# Patient Record
Sex: MALE | Race: WHITE | Employment: UNEMPLOYED | URBAN - NONMETROPOLITAN AREA
[De-identification: names, ages, dates, MRNs, and addresses within clinical notes are randomized per-mention and may not be internally consistent; named-entity substitution may affect disease eponyms.]

---

## 2017-10-16 ENCOUNTER — APPOINTMENT (OUTPATIENT)
Dept: GENERAL RADIOLOGY | Age: 32
End: 2017-10-16

## 2017-10-16 ENCOUNTER — HOSPITAL ENCOUNTER (EMERGENCY)
Age: 32
Discharge: HOME | End: 2017-10-16

## 2017-10-16 VITALS
TEMPERATURE: 99.2 F | BODY MASS INDEX: 25.77 KG/M2 | HEIGHT: 70 IN | DIASTOLIC BLOOD PRESSURE: 68 MMHG | WEIGHT: 180 LBS | SYSTOLIC BLOOD PRESSURE: 102 MMHG | RESPIRATION RATE: 17 BRPM | OXYGEN SATURATION: 98 % | HEART RATE: 62 BPM

## 2017-10-16 DIAGNOSIS — M54.9 MUSCULOSKELETAL BACK PAIN: Primary | ICD-10-CM

## 2017-10-16 DIAGNOSIS — G44.209 TENSION HEADACHE: ICD-10-CM

## 2017-10-16 LAB
ANION GAP SERPL CALCULATED.3IONS-SCNC: 12 MEQ/L (ref 8–16)
BASOPHILS # BLD: 0.6 %
BASOPHILS ABSOLUTE: 0 THOU/MM3 (ref 0–0.1)
BUN BLDV-MCNC: 14 MG/DL (ref 7–22)
CALCIUM SERPL-MCNC: 8.7 MG/DL (ref 8.5–10.5)
CHLORIDE BLD-SCNC: 101 MEQ/L (ref 98–111)
CO2: 29 MEQ/L (ref 23–33)
CREAT SERPL-MCNC: 0.8 MG/DL (ref 0.4–1.2)
EOSINOPHIL # BLD: 1.6 %
EOSINOPHILS ABSOLUTE: 0.1 THOU/MM3 (ref 0–0.4)
GFR SERPL CREATININE-BSD FRML MDRD: > 90 ML/MIN/1.73M2
GLUCOSE BLD-MCNC: 92 MG/DL (ref 70–108)
HCT VFR BLD CALC: 45.7 % (ref 42–52)
HEMOGLOBIN: 16 GM/DL (ref 14–18)
LYMPHOCYTES # BLD: 29.1 %
LYMPHOCYTES ABSOLUTE: 2.4 THOU/MM3 (ref 1–4.8)
MCH RBC QN AUTO: 31.4 PG (ref 27–31)
MCHC RBC AUTO-ENTMCNC: 34.9 GM/DL (ref 33–37)
MCV RBC AUTO: 90.1 FL (ref 80–94)
MONOCYTES # BLD: 6 %
MONOCYTES ABSOLUTE: 0.5 THOU/MM3 (ref 0.4–1.3)
NUCLEATED RED BLOOD CELLS: 0 /100 WBC
OSMOLALITY CALCULATION: 283.2 MOSMOL/KG (ref 275–300)
PDW BLD-RTO: 13.6 % (ref 11.5–14.5)
PLATELET # BLD: 200 THOU/MM3 (ref 130–400)
PMV BLD AUTO: 8.5 MCM (ref 7.4–10.4)
POTASSIUM SERPL-SCNC: 4.3 MEQ/L (ref 3.5–5.2)
RBC # BLD: 5.08 MILL/MM3 (ref 4.7–6.1)
RBC # BLD: NORMAL 10*6/UL
SEG NEUTROPHILS: 62.7 %
SEGMENTED NEUTROPHILS ABSOLUTE COUNT: 5.1 THOU/MM3 (ref 1.8–7.7)
SODIUM BLD-SCNC: 142 MEQ/L (ref 135–145)
TROPONIN T: < 0.01 NG/ML
WBC # BLD: 8.2 THOU/MM3 (ref 4.8–10.8)

## 2017-10-16 PROCEDURE — 84484 ASSAY OF TROPONIN QUANT: CPT

## 2017-10-16 PROCEDURE — 99284 EMERGENCY DEPT VISIT MOD MDM: CPT

## 2017-10-16 PROCEDURE — 36415 COLL VENOUS BLD VENIPUNCTURE: CPT

## 2017-10-16 PROCEDURE — 6360000002 HC RX W HCPCS: Performed by: STUDENT IN AN ORGANIZED HEALTH CARE EDUCATION/TRAINING PROGRAM

## 2017-10-16 PROCEDURE — 96361 HYDRATE IV INFUSION ADD-ON: CPT

## 2017-10-16 PROCEDURE — 80048 BASIC METABOLIC PNL TOTAL CA: CPT

## 2017-10-16 PROCEDURE — 71020 XR CHEST STANDARD TWO VW: CPT

## 2017-10-16 PROCEDURE — 96374 THER/PROPH/DIAG INJ IV PUSH: CPT

## 2017-10-16 PROCEDURE — 2580000003 HC RX 258: Performed by: STUDENT IN AN ORGANIZED HEALTH CARE EDUCATION/TRAINING PROGRAM

## 2017-10-16 PROCEDURE — 6370000000 HC RX 637 (ALT 250 FOR IP): Performed by: STUDENT IN AN ORGANIZED HEALTH CARE EDUCATION/TRAINING PROGRAM

## 2017-10-16 PROCEDURE — 85025 COMPLETE CBC W/AUTO DIFF WBC: CPT

## 2017-10-16 RX ORDER — NAPROXEN 500 MG/1
500 TABLET ORAL 2 TIMES DAILY WITH MEALS
Qty: 30 TABLET | Refills: 0 | Status: SHIPPED | OUTPATIENT
Start: 2017-10-16 | End: 2017-11-07

## 2017-10-16 RX ORDER — CYCLOBENZAPRINE HCL 5 MG
5 TABLET ORAL 3 TIMES DAILY PRN
Qty: 30 TABLET | Refills: 0 | Status: SHIPPED | OUTPATIENT
Start: 2017-10-16 | End: 2017-10-26

## 2017-10-16 RX ORDER — KETOROLAC TROMETHAMINE 30 MG/ML
30 INJECTION, SOLUTION INTRAMUSCULAR; INTRAVENOUS ONCE
Status: COMPLETED | OUTPATIENT
Start: 2017-10-16 | End: 2017-10-16

## 2017-10-16 RX ORDER — CYCLOBENZAPRINE HCL 10 MG
5 TABLET ORAL ONCE
Status: COMPLETED | OUTPATIENT
Start: 2017-10-16 | End: 2017-10-16

## 2017-10-16 RX ORDER — 0.9 % SODIUM CHLORIDE 0.9 %
1000 INTRAVENOUS SOLUTION INTRAVENOUS ONCE
Status: COMPLETED | OUTPATIENT
Start: 2017-10-16 | End: 2017-10-16

## 2017-10-16 RX ADMIN — SODIUM CHLORIDE 1000 ML: 9 INJECTION, SOLUTION INTRAVENOUS at 17:05

## 2017-10-16 RX ADMIN — CYCLOBENZAPRINE HYDROCHLORIDE 5 MG: 10 TABLET, FILM COATED ORAL at 17:47

## 2017-10-16 RX ADMIN — KETOROLAC TROMETHAMINE 30 MG: 30 INJECTION, SOLUTION INTRAMUSCULAR at 17:05

## 2017-10-16 ASSESSMENT — ENCOUNTER SYMPTOMS
NAUSEA: 0
VOMITING: 0
SORE THROAT: 0
RHINORRHEA: 0
COLOR CHANGE: 0
DIARRHEA: 0
SHORTNESS OF BREATH: 0
ABDOMINAL PAIN: 0
BACK PAIN: 1
CHEST TIGHTNESS: 0
CONSTIPATION: 0

## 2017-10-16 ASSESSMENT — PAIN DESCRIPTION - PAIN TYPE
TYPE: ACUTE PAIN
TYPE: ACUTE PAIN

## 2017-10-16 ASSESSMENT — PAIN SCALES - GENERAL
PAINLEVEL_OUTOF10: 6
PAINLEVEL_OUTOF10: 6
PAINLEVEL_OUTOF10: 7

## 2017-10-16 ASSESSMENT — PAIN DESCRIPTION - FREQUENCY
FREQUENCY: CONTINUOUS
FREQUENCY: CONTINUOUS

## 2017-10-16 ASSESSMENT — PAIN DESCRIPTION - LOCATION
LOCATION: BACK;SHOULDER
LOCATION: NECK

## 2017-10-16 ASSESSMENT — PAIN DESCRIPTION - ORIENTATION: ORIENTATION: RIGHT

## 2017-10-16 NOTE — LETTER
[facility]'s Emergency Department   [place], [address]          PROOF OF PRESENCE      To Whom It May Concern:    Sylvester Santos was present in the Emergency Department at [facility] Emergency Department on 10/16/2017.                                      Sincerely,      Cari Hilton RN for Souleymane Martinez      ____________________________________________________

## 2017-10-16 NOTE — ED PROVIDER NOTES
[facility] EMERGENCY DEPT      CHIEF COMPLAINT       Chief Complaint   Patient presents with    Headache    Back Pain       Nurses Notes reviewed and I agree except as noted in the HPI. HISTORY OF PRESENT ILLNESS    Tresa De La Cruz is a 36 y.o. male who presents with back pain and a headache. He states the back pain started a few days ago. He states he was originally unable to identify where the back pain was located. Yesterday, the back pain began to localize to the right side and is concentrated just below his right scapula. He states his headache began this morning about an hour or two after starting work. He describes the back pain as achy, but is intermittently sharp, especially when he takes deep breaths. He describes his headache as a dull, achy pain that is generalized to all parts of his head. He denies getting headaches but he is concerned he may be getting an ear infection because he usually gets a headache when he gets ear infections. He also states he has frequent problems with his ears. He denies otalgia currently, but states they feel full. Pt states he does a lot of repetitive movements at work and thinks he may have strained a muscle in his back there. He does admit that his back felt much better over the weekend when he wasn't working, but started hurting more as soon as he started work this morning. Pt does not take any medications regularly and denies and medical problems. Pt denies fever, chills, nausea, vomiting, abdominal pain, chest pain, SOB, diarrhea, constipation, vision changes, numbness, tingling, and weakness. REVIEW OF SYSTEMS     Review of Systems   Constitutional: Negative for appetite change, chills, diaphoresis and fever. HENT: Positive for congestion. Negative for ear discharge, ear pain, rhinorrhea, sore throat and tinnitus. Eyes: Negative for visual disturbance. Respiratory: Negative for chest tightness and shortness of breath.     Cardiovascular: Negative for chest pain and palpitations. Gastrointestinal: Negative for abdominal pain, constipation, diarrhea, nausea and vomiting. Genitourinary: Negative for dysuria, frequency, hematuria and urgency. Musculoskeletal: Positive for back pain and myalgias. Negative for arthralgias, neck pain and neck stiffness. Skin: Negative for color change and pallor. Neurological: Positive for headaches. Negative for dizziness, weakness, light-headedness and numbness. Hematological: Negative for adenopathy. Psychiatric/Behavioral: Negative for agitation, confusion, decreased concentration and sleep disturbance. PAST MEDICAL HISTORY    has no past medical history on file. SURGICAL HISTORY      has a past surgical history that includes Appendectomy and Dental surgery. CURRENT MEDICATIONS       Discharge Medication List as of 10/16/2017  7:04 PM      CONTINUE these medications which have NOT CHANGED    Details   permethrin (ELIMITE) 5 % cream Apply topically as directed. Leave on for 8-12 hours then rinse off thoroughly. Repeat in 1 week., Disp-1 Tube, R-1, Print             ALLERGIES     has No Known Allergies. FAMILY HISTORY     indicated that the status of his father is unknown.    family history includes Asthma in his father; High Blood Pressure in his father. SOCIAL HISTORY      reports that he has been smoking Cigarettes. He has a 5.00 pack-year smoking history. He does not have any smokeless tobacco history on file. He reports that he does not drink alcohol or use drugs. PHYSICAL EXAM     INITIAL VITALS:  height is 5' 10\" (1.778 m) and weight is 180 lb (81.6 kg). His oral temperature is 99.2 °F (37.3 °C). His blood pressure is 102/68 and his pulse is 62. His respiration is 17 and oxygen saturation is 98%. Physical Exam   Constitutional: He is oriented to person, place, and time. He appears well-developed and well-nourished. No distress. HENT:   Head: Normocephalic and atraumatic.    Eyes: Conjunctivae are normal.   Neck: Normal range of motion and full passive range of motion without pain. Neck supple. Muscular tenderness present. No spinous process tenderness present. No Brudzinski's sign and no Kernig's sign noted. Cardiovascular: Normal rate, regular rhythm and normal heart sounds. Exam reveals no gallop and no friction rub. No murmur heard. Pulmonary/Chest: Effort normal and breath sounds normal. No respiratory distress. He has no wheezes. Abdominal: Soft. He exhibits no distension and no mass. There is no tenderness. There is no guarding. Musculoskeletal: Normal range of motion. He exhibits tenderness. He exhibits no edema or deformity. Thoracic back: He exhibits pain. He exhibits normal range of motion, no tenderness, no bony tenderness, no swelling, no deformity and no spasm. Reported tenderness to palpation to the right side of the back just inferior to scapula, right shoulder, and right side of the neck. Lymphadenopathy:     He has no cervical adenopathy. Neurological: He is alert and oriented to person, place, and time. Coordination normal.   Skin: Skin is warm and dry. He is not diaphoretic. No pallor. Psychiatric: His behavior is normal. Thought content normal.   Nursing note and vitals reviewed. DIFFERENTIAL DIAGNOSIS:   Lumbar back strain, sprain, herniated disc, less likely fracture or acs    DIAGNOSTIC RESULTS     EKG: All EKG's are interpreted by the Emergency Department Physician who either signs or Co-signs this chart in the absence of a cardiologist.  none    RADIOLOGY: non-plain film images(s) such as CT, Ultrasound and MRI are read by the radiologist.  Plain radiographic images are visualized and preliminarily interpreted by the emergency physician unless otherwise stated below. XR CHEST STANDARD (2 VW)   Final Result   1. No acute cardiopulmonary disease. **This report has been created using voice recognition software.   It may contain minor errors which are inherent in voice recognition technology. **      Final report electronically signed by Dr. Jamee Tate on 10/16/2017 5:20 PM          LABS:   Results for orders placed or performed during the hospital encounter of 10/16/17   Troponin   Result Value Ref Range    Troponin T < 0.010 ng/ml   CBC auto differential   Result Value Ref Range    WBC 8.2 4.8 - 10.8 thou/mm3    RBC 5.08 4.70 - 6.10 mill/mm3    Hemoglobin 16.0 14.0 - 18.0 gm/dl    Hematocrit 45.7 42.0 - 52.0 %    MCV 90.1 80.0 - 94.0 fL    MCH 31.4 (H) 27.0 - 31.0 pg    MCHC 34.9 33.0 - 37.0 gm/dl    RDW 13.6 11.5 - 14.5 %    Platelets 844 275 - 794 thou/mm3    MPV 8.5 7.4 - 10.4 mcm    RBC Morphology NORMAL     Seg Neutrophils 62.7 %    Lymphocytes 29.1 %    Monocytes 6.0 %    Eosinophils 1.6 %    Basophils 0.6 %    nRBC 0 /100 wbc    Segs Absolute 5.1 1.8 - 7.7 thou/mm3    Lymphocytes # 2.4 1.0 - 4.8 thou/mm3    Monocytes # 0.5 0.4 - 1.3 thou/mm3    Eosinophils # 0.1 0.0 - 0.4 thou/mm3    Basophils # 0.0 0.0 - 0.1 thou/mm3   Basic Metabolic Panel   Result Value Ref Range    Sodium 142 135 - 145 meq/L    Potassium 4.3 3.5 - 5.2 meq/L    Chloride 101 98 - 111 meq/L    CO2 29 23 - 33 meq/L    Glucose 92 70 - 108 mg/dL    BUN 14 7 - 22 mg/dL    CREATININE 0.8 0.4 - 1.2 mg/dL    Calcium 8.7 8.5 - 10.5 mg/dL   Anion Gap   Result Value Ref Range    Anion Gap 12.0 8.0 - 16.0 meq/L   Glomerular Filtration Rate, Estimated   Result Value Ref Range    Est, Glom Filt Rate >90 ml/min/1.73m2   Osmolality   Result Value Ref Range    Osmolality Calc 283.2 275.0 - 300 mOsmol/kg       EMERGENCY DEPARTMENT COURSE:   Vitals:    Vitals:    10/16/17 1617 10/16/17 1731 10/16/17 1924   BP: 110/65 121/72 102/68   Pulse: 65 67 62   Resp: 16 16 17   Temp: 99.2 °F (37.3 °C)     TempSrc: Oral     SpO2: 98% 99% 98%   Weight:  180 lb (81.6 kg)    Height:  5' 10\" (1.778 m)        [facility]  Patient seen and evaluated. Labs and imaging ordered. Vitals are stable in the ED. Imaging and lab work was reassuring and patient expressed relief of symptoms with NS, toradol, and flexeril. He is comfortable with plan of discharge home and will return if symptoms worsen or change. Medications Given in the ED  Medications   0.9 % sodium chloride bolus (0 mLs Intravenous Stopped 10/16/17 1923)   ketorolac (TORADOL) injection 30 mg (30 mg Intravenous Given 10/16/17 1705)   cyclobenzaprine (FLEXERIL) tablet 5 mg (5 mg Oral Given 10/16/17 5197)         CRITICAL CARE[de-identified]   NONE    CONSULTS:  None    PROCEDURES:  None    FINAL IMPRESSION      1. Musculoskeletal back pain    2.  Tension headache          DISPOSITION/PLAN   Decision To Discharge    PATIENT REFERRED TO:  [facility] EMERGENCY DEPT  [address]  [phone]    If symptoms worsen      DISCHARGE MEDICATIONS:  Discharge Medication List as of 10/16/2017  7:04 PM      START taking these medications    Details   cyclobenzaprine (FLEXERIL) 5 MG tablet Take 1 tablet by mouth 3 times daily as needed for Muscle spasms, Disp-30 tablet, R-0Print      naproxen (NAPROSYN) 500 MG tablet Take 1 tablet by mouth 2 times daily (with meals) for 30 doses, Disp-30 tablet, R-0Print             (Please note that portions of this note were completed with a voice recognition program.  Efforts were made to edit the dictations but occasionally words are mis-transcribed.)      Ebony Machado PA-C 10/16/17 4:48 PM            Ebony Machado PA-C  10/25/17 7825

## 2017-10-16 NOTE — ED NOTES
Patient in ed room 37. Patient complain of headache and pain behind right shoulder blade. Patient in gown on monitor, vital signs obtained and assessment completed.      Chon Menjivar RN  10/16/17 3526

## 2017-11-07 ENCOUNTER — APPOINTMENT (OUTPATIENT)
Dept: GENERAL RADIOLOGY | Age: 32
End: 2017-11-07

## 2017-11-07 ENCOUNTER — HOSPITAL ENCOUNTER (EMERGENCY)
Age: 32
Discharge: HOME | End: 2017-11-07

## 2017-11-07 VITALS
DIASTOLIC BLOOD PRESSURE: 70 MMHG | RESPIRATION RATE: 18 BRPM | HEART RATE: 75 BPM | OXYGEN SATURATION: 96 % | SYSTOLIC BLOOD PRESSURE: 126 MMHG | TEMPERATURE: 98.3 F

## 2017-11-07 DIAGNOSIS — J40 BRONCHITIS: Primary | ICD-10-CM

## 2017-11-07 DIAGNOSIS — M94.0 COSTOCHONDRITIS: ICD-10-CM

## 2017-11-07 LAB
ANION GAP SERPL CALCULATED.3IONS-SCNC: 11 MEQ/L (ref 8–16)
BASOPHILS # BLD: 0.7 %
BASOPHILS ABSOLUTE: 0.1 THOU/MM3 (ref 0–0.1)
BUN BLDV-MCNC: 11 MG/DL (ref 7–22)
CALCIUM SERPL-MCNC: 8.5 MG/DL (ref 8.5–10.5)
CHLORIDE BLD-SCNC: 105 MEQ/L (ref 98–111)
CO2: 26 MEQ/L (ref 23–33)
CREAT SERPL-MCNC: 0.6 MG/DL (ref 0.4–1.2)
D-DIMER QUANTITATIVE: < 215 NG/ML FEU (ref 0–500)
EOSINOPHIL # BLD: 2.3 %
EOSINOPHILS ABSOLUTE: 0.2 THOU/MM3 (ref 0–0.4)
GFR SERPL CREATININE-BSD FRML MDRD: > 90 ML/MIN/1.73M2
GLUCOSE BLD-MCNC: 83 MG/DL (ref 70–108)
HCT VFR BLD CALC: 50.3 % (ref 42–52)
HEMOGLOBIN: 17.8 GM/DL (ref 14–18)
LYMPHOCYTES # BLD: 27.2 %
LYMPHOCYTES ABSOLUTE: 2.7 THOU/MM3 (ref 1–4.8)
MCH RBC QN AUTO: 32 PG (ref 27–31)
MCHC RBC AUTO-ENTMCNC: 35.4 GM/DL (ref 33–37)
MCV RBC AUTO: 90.3 FL (ref 80–94)
MONOCYTES # BLD: 5.6 %
MONOCYTES ABSOLUTE: 0.6 THOU/MM3 (ref 0.4–1.3)
NUCLEATED RED BLOOD CELLS: 0 /100 WBC
OSMOLALITY CALCULATION: 281.7 MOSMOL/KG (ref 275–300)
PDW BLD-RTO: 13.7 % (ref 11.5–14.5)
PLATELET # BLD: 201 THOU/MM3 (ref 130–400)
PMV BLD AUTO: 8.3 MCM (ref 7.4–10.4)
POTASSIUM SERPL-SCNC: 4 MEQ/L (ref 3.5–5.2)
RBC # BLD: 5.58 MILL/MM3 (ref 4.7–6.1)
SEG NEUTROPHILS: 64.2 %
SEGMENTED NEUTROPHILS ABSOLUTE COUNT: 6.4 THOU/MM3 (ref 1.8–7.7)
SODIUM BLD-SCNC: 142 MEQ/L (ref 135–145)
TROPONIN T: < 0.01 NG/ML
WBC # BLD: 9.9 THOU/MM3 (ref 4.8–10.8)

## 2017-11-07 PROCEDURE — 6360000002 HC RX W HCPCS: Performed by: PHYSICIAN ASSISTANT

## 2017-11-07 PROCEDURE — 96372 THER/PROPH/DIAG INJ SC/IM: CPT

## 2017-11-07 PROCEDURE — 85025 COMPLETE CBC W/AUTO DIFF WBC: CPT

## 2017-11-07 PROCEDURE — 93005 ELECTROCARDIOGRAM TRACING: CPT

## 2017-11-07 PROCEDURE — 99285 EMERGENCY DEPT VISIT HI MDM: CPT

## 2017-11-07 PROCEDURE — 80048 BASIC METABOLIC PNL TOTAL CA: CPT

## 2017-11-07 PROCEDURE — 71020 XR CHEST STANDARD TWO VW: CPT

## 2017-11-07 PROCEDURE — 36415 COLL VENOUS BLD VENIPUNCTURE: CPT

## 2017-11-07 PROCEDURE — 6370000000 HC RX 637 (ALT 250 FOR IP): Performed by: PHYSICIAN ASSISTANT

## 2017-11-07 PROCEDURE — 85379 FIBRIN DEGRADATION QUANT: CPT

## 2017-11-07 PROCEDURE — 84484 ASSAY OF TROPONIN QUANT: CPT

## 2017-11-07 RX ORDER — TRAMADOL HYDROCHLORIDE 50 MG/1
50 TABLET ORAL EVERY 6 HOURS PRN
Qty: 12 TABLET | Refills: 0 | Status: SHIPPED | OUTPATIENT
Start: 2017-11-07 | End: 2019-05-17 | Stop reason: ALTCHOICE

## 2017-11-07 RX ORDER — LIDOCAINE 50 MG/G
1 PATCH TOPICAL DAILY
Qty: 15 PATCH | Refills: 0 | Status: SHIPPED | OUTPATIENT
Start: 2017-11-07 | End: 2017-11-07 | Stop reason: ALTCHOICE

## 2017-11-07 RX ORDER — KETOROLAC TROMETHAMINE 30 MG/ML
60 INJECTION, SOLUTION INTRAMUSCULAR; INTRAVENOUS ONCE
Status: COMPLETED | OUTPATIENT
Start: 2017-11-07 | End: 2017-11-07

## 2017-11-07 RX ORDER — LIDOCAINE 50 MG/G
1 PATCH TOPICAL ONCE
Status: DISCONTINUED | OUTPATIENT
Start: 2017-11-07 | End: 2017-11-07 | Stop reason: HOSPADM

## 2017-11-07 RX ORDER — NAPROXEN 500 MG/1
500 TABLET ORAL 2 TIMES DAILY
Qty: 60 TABLET | Refills: 0 | Status: SHIPPED | OUTPATIENT
Start: 2017-11-07 | End: 2019-05-17 | Stop reason: ALTCHOICE

## 2017-11-07 RX ORDER — LIDOCAINE 50 MG/G
1 PATCH TOPICAL DAILY
Qty: 15 PATCH | Refills: 0 | Status: SHIPPED | OUTPATIENT
Start: 2017-11-07 | End: 2019-05-17 | Stop reason: ALTCHOICE

## 2017-11-07 RX ADMIN — KETOROLAC TROMETHAMINE 60 MG: 30 INJECTION, SOLUTION INTRAMUSCULAR at 20:27

## 2017-11-07 ASSESSMENT — ENCOUNTER SYMPTOMS
ABDOMINAL PAIN: 0
STRIDOR: 0
COUGH: 1
COLOR CHANGE: 0
SHORTNESS OF BREATH: 1
VOMITING: 0
NAUSEA: 0
BACK PAIN: 0
CHEST TIGHTNESS: 1
DIARRHEA: 0
WHEEZING: 0

## 2017-11-07 ASSESSMENT — PAIN SCALES - GENERAL: PAINLEVEL_OUTOF10: 9

## 2017-11-08 LAB
EKG ATRIAL RATE: 74 BPM
EKG P AXIS: 76 DEGREES
EKG P-R INTERVAL: 160 MS
EKG Q-T INTERVAL: 338 MS
EKG QRS DURATION: 88 MS
EKG QTC CALCULATION (BAZETT): 375 MS
EKG R AXIS: 40 DEGREES
EKG T AXIS: 53 DEGREES
EKG VENTRICULAR RATE: 74 BPM

## 2017-11-08 NOTE — ED PROVIDER NOTES
[facility]  eMERGENCY dEPARTMENT eNCOUnter          [address] [facility]       Chief Complaint   Patient presents with    Chest Pain     x 5 days       Nurses Notes reviewed and I agree except as noted in the HPI. HISTORY OF PRESENT ILLNESS    Pako Schwartz is a 36 y.o. male who presents to the Emergency Department for the evaluation of chest pain. The patient reports that his chest pain began five days ago without known injury and has gradually worsened with time. The pain is located in the left side of his chest with radiation into the left side of his neck. His pain is described as intermittent stabbing that lasts anywhere from 30 seconds to 10 minutes at a time. Patient currently rates his pain as an 8/10 in severity and states worsens with palpation, movement, and with cough. If he rests completely still, he has no pain. He does also note that he performs heavy lifting regularly at work. He also complains of associated shortness of breath, chest tightness, and a cough that has been improving. The patient denies any fever, chills, diaphoresis, palpitations, nausea, or vomiting. Patient denies any history of hypertension, hypercholesterolemia, diabetes, or a family history of heart disease at a young age. Denies any IV or other drug use. He is a smoker. No further complaints at initial time of encounter. The HPI was provided by the patient. REVIEW OF SYSTEMS     Review of Systems   Constitutional: Negative for chills, diaphoresis, fatigue and fever. Respiratory: Positive for cough, chest tightness and shortness of breath. Negative for wheezing and stridor. Cardiovascular: Positive for chest pain. Negative for palpitations and leg swelling. Gastrointestinal: Negative for abdominal pain, diarrhea, nausea and vomiting. Musculoskeletal: Negative for back pain and neck pain. Skin: Negative for color change and pallor. Allergic/Immunologic: Negative for immunocompromised state. patient's condition to remain stable during the duration of their stay. I explained my proposed course of treatment to the patient, and they were amenable to my decision. They were discharged home with prescriptions for Tramadol and a Lidoderm patch, and they will return to the ED if their symptoms become more severe in nature, or otherwise change. CRITICAL CARE:   None. CONSULTS:   None    PROCEDURES:  None    FINAL IMPRESSION      1. Bronchitis    2. Costochondritis          DISPOSITION/PLAN     Discharge    PATIENT REFERRED TO:  [address] [facility]  [address]  Call in 1 day      [address] [place] DEPT  [address] [facility]  [phone]    If symptoms worsen      DISCHARGE MEDICATIONS:  Discharge Medication List as of 11/7/2017  8:17 PM      START taking these medications    Details   lidocaine (LIDODERM) 5 % Place 1 patch onto the skin daily 12 hours on, 12 hours off., Disp-15 patch, R-0Print      traMADol (ULTRAM) 50 MG tablet Take 1 tablet by mouth every 6 hours as needed for Pain ., Disp-12 tablet, R-0Print             (Please note that portions of this note were completed with a voice recognition program.  Efforts were made to edit the dictations but occasionally words are mis-transcribed.)    This patient was seen independently by Arnaud Macdonald PA-C a Mid-Level Provider in the [facility] Emergency Department. The patient was given an opportunity to see the Emergency Attending. The patient voiced understanding that I was a Mid-Level Provider and was in agreement with being seen independently by myself. Scribe:  Laureen Mendez 11/07/17 8:09 PM Scribing for and in the presence of Arnaud Macdonald PA-C.     Signed by: Laurie Lara, 11/7/17 10:49 PM    Provider:  I personally performed the services described in the documentation, reviewed and edited the documentation which was dictated to the scribe in my presence, and it

## 2018-05-14 ENCOUNTER — HOSPITAL ENCOUNTER (OUTPATIENT)
Age: 33
Discharge: HOME | End: 2018-05-14

## 2018-05-14 ENCOUNTER — HOSPITAL ENCOUNTER (OUTPATIENT)
Dept: GENERAL RADIOLOGY | Age: 33
Discharge: HOME | End: 2018-05-14

## 2018-05-14 DIAGNOSIS — S33.5XXA LUMBAR SPRAIN, INITIAL ENCOUNTER: ICD-10-CM

## 2018-06-08 ENCOUNTER — HOSPITAL ENCOUNTER (OUTPATIENT)
Dept: PHYSICAL THERAPY | Age: 33
Setting detail: THERAPIES SERIES
Discharge: HOME | End: 2018-06-08
Payer: COMMERCIAL

## 2018-06-08 PROCEDURE — 97161 PT EVAL LOW COMPLEX 20 MIN: CPT

## 2018-06-08 PROCEDURE — 97110 THERAPEUTIC EXERCISES: CPT

## 2018-06-08 ASSESSMENT — PAIN DESCRIPTION - PAIN TYPE: TYPE: ACUTE PAIN

## 2018-06-08 ASSESSMENT — PAIN DESCRIPTION - LOCATION: LOCATION: BACK

## 2018-06-08 ASSESSMENT — PAIN DESCRIPTION - DESCRIPTORS: DESCRIPTORS: TIGHTNESS

## 2018-06-08 ASSESSMENT — PAIN SCALES - GENERAL: PAINLEVEL_OUTOF10: 5

## 2018-06-08 ASSESSMENT — PAIN DESCRIPTION - ORIENTATION: ORIENTATION: LOWER

## 2018-06-12 ENCOUNTER — HOSPITAL ENCOUNTER (OUTPATIENT)
Dept: PHYSICAL THERAPY | Age: 33
Setting detail: THERAPIES SERIES
Discharge: HOME | End: 2018-06-12
Payer: COMMERCIAL

## 2018-06-12 PROCEDURE — 97110 THERAPEUTIC EXERCISES: CPT

## 2018-06-12 PROCEDURE — 97035 APP MDLTY 1+ULTRASOUND EA 15: CPT

## 2018-06-12 ASSESSMENT — PAIN DESCRIPTION - LOCATION: LOCATION: BACK

## 2018-06-12 ASSESSMENT — PAIN DESCRIPTION - PAIN TYPE: TYPE: ACUTE PAIN

## 2018-06-12 ASSESSMENT — PAIN SCALES - GENERAL: PAINLEVEL_OUTOF10: 3

## 2018-06-12 ASSESSMENT — PAIN DESCRIPTION - ORIENTATION: ORIENTATION: LOWER;LEFT;RIGHT

## 2018-06-14 ENCOUNTER — HOSPITAL ENCOUNTER (OUTPATIENT)
Dept: PHYSICAL THERAPY | Age: 33
Setting detail: THERAPIES SERIES
Discharge: HOME | End: 2018-06-14
Payer: COMMERCIAL

## 2018-06-14 PROCEDURE — 97110 THERAPEUTIC EXERCISES: CPT

## 2018-06-14 PROCEDURE — 97035 APP MDLTY 1+ULTRASOUND EA 15: CPT

## 2018-06-14 ASSESSMENT — PAIN DESCRIPTION - LOCATION: LOCATION: BACK

## 2018-06-14 ASSESSMENT — PAIN SCALES - GENERAL: PAINLEVEL_OUTOF10: 3

## 2018-06-14 ASSESSMENT — PAIN DESCRIPTION - ORIENTATION: ORIENTATION: RIGHT;LEFT;LOWER

## 2018-06-14 ASSESSMENT — PAIN DESCRIPTION - PAIN TYPE: TYPE: ACUTE PAIN

## 2018-06-15 ENCOUNTER — HOSPITAL ENCOUNTER (OUTPATIENT)
Dept: PHYSICAL THERAPY | Age: 33
Setting detail: THERAPIES SERIES
Discharge: HOME | End: 2018-06-15
Payer: COMMERCIAL

## 2018-06-18 ENCOUNTER — HOSPITAL ENCOUNTER (OUTPATIENT)
Dept: PHYSICAL THERAPY | Age: 33
Setting detail: THERAPIES SERIES
Discharge: HOME | End: 2018-06-18
Payer: COMMERCIAL

## 2018-06-18 PROCEDURE — 97110 THERAPEUTIC EXERCISES: CPT

## 2018-06-20 ENCOUNTER — HOSPITAL ENCOUNTER (OUTPATIENT)
Dept: PHYSICAL THERAPY | Age: 33
Setting detail: THERAPIES SERIES
Discharge: HOME | End: 2018-06-20
Payer: COMMERCIAL

## 2018-06-20 PROCEDURE — 97110 THERAPEUTIC EXERCISES: CPT

## 2018-06-22 ENCOUNTER — HOSPITAL ENCOUNTER (OUTPATIENT)
Dept: PHYSICAL THERAPY | Age: 33
Setting detail: THERAPIES SERIES
Discharge: HOME | End: 2018-06-22
Payer: COMMERCIAL

## 2018-06-25 ENCOUNTER — HOSPITAL ENCOUNTER (OUTPATIENT)
Dept: PHYSICAL THERAPY | Age: 33
Setting detail: THERAPIES SERIES
Discharge: HOME | End: 2018-06-25
Payer: COMMERCIAL

## 2018-06-25 PROCEDURE — 97110 THERAPEUTIC EXERCISES: CPT

## 2018-06-27 ENCOUNTER — HOSPITAL ENCOUNTER (OUTPATIENT)
Dept: PHYSICAL THERAPY | Age: 33
Setting detail: THERAPIES SERIES
Discharge: HOME | End: 2018-06-27
Payer: COMMERCIAL

## 2018-06-27 PROCEDURE — 97110 THERAPEUTIC EXERCISES: CPT

## 2018-06-29 ENCOUNTER — HOSPITAL ENCOUNTER (OUTPATIENT)
Dept: PHYSICAL THERAPY | Age: 33
Setting detail: THERAPIES SERIES
Discharge: HOME | End: 2018-06-29
Payer: COMMERCIAL

## 2018-06-29 PROCEDURE — 97110 THERAPEUTIC EXERCISES: CPT

## 2018-07-06 ENCOUNTER — HOSPITAL ENCOUNTER (OUTPATIENT)
Dept: PHYSICAL THERAPY | Age: 33
Setting detail: THERAPIES SERIES
Discharge: HOME | End: 2018-07-06
Payer: COMMERCIAL

## 2019-05-17 ENCOUNTER — HOSPITAL ENCOUNTER (EMERGENCY)
Age: 34
Discharge: HOME | End: 2019-05-17
Payer: COMMERCIAL

## 2019-05-17 VITALS
WEIGHT: 220 LBS | RESPIRATION RATE: 12 BRPM | OXYGEN SATURATION: 98 % | TEMPERATURE: 98 F | DIASTOLIC BLOOD PRESSURE: 82 MMHG | HEART RATE: 59 BPM | BODY MASS INDEX: 31.57 KG/M2 | SYSTOLIC BLOOD PRESSURE: 124 MMHG

## 2019-05-17 DIAGNOSIS — J02.9 ACUTE PHARYNGITIS, UNSPECIFIED ETIOLOGY: ICD-10-CM

## 2019-05-17 DIAGNOSIS — J06.9 ACUTE UPPER RESPIRATORY INFECTION: ICD-10-CM

## 2019-05-17 DIAGNOSIS — J01.90 ACUTE SINUSITIS, RECURRENCE NOT SPECIFIED, UNSPECIFIED LOCATION: ICD-10-CM

## 2019-05-17 DIAGNOSIS — R05.9 COUGH: Primary | ICD-10-CM

## 2019-05-17 PROCEDURE — 99212 OFFICE O/P EST SF 10 MIN: CPT

## 2019-05-17 PROCEDURE — 99214 OFFICE O/P EST MOD 30 MIN: CPT | Performed by: NURSE PRACTITIONER

## 2019-05-17 RX ORDER — AMOXICILLIN AND CLAVULANATE POTASSIUM 875; 125 MG/1; MG/1
1 TABLET, FILM COATED ORAL 2 TIMES DAILY
Qty: 20 TABLET | Refills: 0 | Status: SHIPPED | OUTPATIENT
Start: 2019-05-17 | End: 2019-05-27

## 2019-05-17 RX ORDER — IBUPROFEN 800 MG/1
800 TABLET ORAL EVERY 6 HOURS PRN
COMMUNITY
End: 2020-01-07

## 2019-05-17 ASSESSMENT — ENCOUNTER SYMPTOMS
SHORTNESS OF BREATH: 0
EYE DISCHARGE: 0
WHEEZING: 0
STRIDOR: 0
DIARRHEA: 0
COUGH: 1
NAUSEA: 0
SORE THROAT: 1
VOMITING: 0

## 2019-05-17 NOTE — ED PROVIDER NOTES
tobacco use includes chew. He reports that he does not drink alcohol or use drugs. PHYSICAL EXAM     ED TRIAGE VITALS  BP: 124/82, Temp: 98 °F (36.7 °C), Pulse: 59, Resp: 12, SpO2: 98 %,Estimated body mass index is 31.57 kg/m² as calculated from the following:    Height as of 10/16/17: 5' 10\" (1.778 m). Weight as of this encounter: 220 lb (99.8 kg). ,No LMP for male patient. Physical Exam   Constitutional: He is oriented to person, place, and time. He appears well-developed and well-nourished. No distress. HENT:   Head: Normocephalic and atraumatic. Mouth/Throat: Oropharynx is clear and moist and mucous membranes are normal. No oropharyngeal exudate. Tonsils are 2+ on the right. Tonsils are 2+ on the left. No tonsillar exudate. Eyes: Conjunctivae are normal. Right eye exhibits no discharge. Left eye exhibits no discharge. Right conjunctiva is not injected. Left conjunctiva is not injected. No scleral icterus. Right eye exhibits normal extraocular motion. Left eye exhibits normal extraocular motion. Pupils are equal.   Neck: Normal range of motion. Cardiovascular: Normal rate and regular rhythm. Exam reveals no gallop and no friction rub. No murmur heard. Pulmonary/Chest: Effort normal and breath sounds normal. No accessory muscle usage. No respiratory distress. He has no wheezes. Musculoskeletal: Normal range of motion. Right knee: He exhibits normal range of motion. Left knee: He exhibits normal range of motion. Lymphadenopathy:        Head (right side): No submental, no submandibular and no tonsillar adenopathy present. Head (left side): No submental, no submandibular and no tonsillar adenopathy present. Neurological: He is alert and oriented to person, place, and time. Skin: Skin is warm and dry. He is not diaphoretic. Psychiatric: He has a normal mood and affect.  His behavior is normal. Judgment and thought content normal.       DIAGNOSTIC RESULTS     Labs:No results found for this visit on 05/17/19. IMAGING:    No orders to display     URGENT CARE COURSE:     Vitals:    05/17/19 1021   BP: 124/82   Pulse: 59   Resp: 12   Temp: 98 °F (36.7 °C)   TempSrc: Temporal   SpO2: 98%   Weight: 220 lb (99.8 kg)       Medications - No data to display         PROCEDURES:  None    FINAL IMPRESSION      1. Cough    2. Acute upper respiratory infection    3. Acute sinusitis, recurrence not specified, unspecified location    4. Acute pharyngitis, unspecified etiology          DISPOSITION/ PLAN   Patient is discharged home with prescription for Augmentin that he may fill the next 2-3 days if symptoms do not seem to be improving. Discussed with patient that most upper respiratory infections are viral in nature and can take up to 1 full week to resolve on their own. Patient declined use of strep swab at this time. Patient should establish with a primary care provider within the next week if symptoms do not seem to be improving for any follow-up needs. May follow up in urgent care if unable to be seen within that time. PATIENT REFERRED TO:  No primary care provider on file. No primary physician on file.       DISCHARGE MEDICATIONS:  Discharge Medication List as of 5/17/2019 10:42 AM      START taking these medications    Details   amoxicillin-clavulanate (AUGMENTIN) 875-125 MG per tablet Take 1 tablet by mouth 2 times daily for 10 days, Disp-20 tablet, R-0Print             Discharge Medication List as of 5/17/2019 10:42 AM          Discharge Medication List as of 5/17/2019 10:42 AM          GAVI Meraz NP    (Please note that portions of this note were completed with a voice recognition program. Efforts were made to edit the dictations but occasionally words are mis-transcribed.)         GAVI Kessler NP  05/17/19 1282

## 2019-05-17 NOTE — ED NOTES
Patient understood instructions verbally,  Follow up with PCP with any concerns, or worse URI symptoms with elevated fevers, nausea, vomiting,follow up with ED. Prescriptions, work excuse with patient. ambulated self to lobby,stable condition.       Errol Montenegro LPN  98/02/27 1202

## 2019-05-17 NOTE — ED TRIAGE NOTES
Patient ambulated to rm. 8, symptoms started  Tues, fatigue, body aches, sore throat, dry cough, 800 mg ibuprofen taken.

## 2019-10-17 ENCOUNTER — HOSPITAL ENCOUNTER (OUTPATIENT)
Dept: PHYSICAL THERAPY | Age: 34
Setting detail: THERAPIES SERIES
Discharge: HOME | End: 2019-10-17
Payer: COMMERCIAL

## 2019-12-26 ENCOUNTER — HOSPITAL ENCOUNTER (OUTPATIENT)
Dept: PHYSICAL THERAPY | Age: 34
Setting detail: THERAPIES SERIES
Discharge: HOME | End: 2019-12-26
Payer: COMMERCIAL

## 2019-12-26 PROCEDURE — 97161 PT EVAL LOW COMPLEX 20 MIN: CPT

## 2019-12-26 PROCEDURE — 97140 MANUAL THERAPY 1/> REGIONS: CPT

## 2019-12-26 PROCEDURE — 97110 THERAPEUTIC EXERCISES: CPT

## 2019-12-26 ASSESSMENT — PAIN DESCRIPTION - LOCATION: LOCATION: BACK

## 2019-12-26 ASSESSMENT — PAIN DESCRIPTION - DESCRIPTORS: DESCRIPTORS: ACHING;SHARP

## 2019-12-26 ASSESSMENT — PAIN DESCRIPTION - PAIN TYPE: TYPE: CHRONIC PAIN

## 2019-12-26 ASSESSMENT — PAIN DESCRIPTION - PROGRESSION: CLINICAL_PROGRESSION: NOT CHANGED

## 2019-12-26 ASSESSMENT — PAIN DESCRIPTION - ORIENTATION: ORIENTATION: MID

## 2019-12-26 ASSESSMENT — PAIN DESCRIPTION - FREQUENCY: FREQUENCY: INTERMITTENT

## 2019-12-26 ASSESSMENT — PAIN SCALES - GENERAL: PAINLEVEL_OUTOF10: 7

## 2020-01-02 ENCOUNTER — HOSPITAL ENCOUNTER (OUTPATIENT)
Dept: PHYSICAL THERAPY | Age: 35
Setting detail: THERAPIES SERIES
Discharge: HOME | End: 2020-01-02
Payer: COMMERCIAL

## 2020-01-02 PROCEDURE — 97110 THERAPEUTIC EXERCISES: CPT

## 2020-01-02 PROCEDURE — 97140 MANUAL THERAPY 1/> REGIONS: CPT

## 2020-01-02 ASSESSMENT — PAIN DESCRIPTION - PAIN TYPE: TYPE: CHRONIC PAIN

## 2020-01-02 ASSESSMENT — PAIN SCALES - GENERAL: PAINLEVEL_OUTOF10: 5

## 2020-01-02 ASSESSMENT — PAIN DESCRIPTION - LOCATION: LOCATION: BACK

## 2020-01-02 ASSESSMENT — PAIN DESCRIPTION - ORIENTATION: ORIENTATION: MID

## 2020-01-02 NOTE — PROGRESS NOTES
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 Additional Pertinent Hx:  MRSA contact precaution                  General:  PT Visit Information  Onset Date: 19  PT Insurance Information: [employer] of [address] required, He has been approved for 12 visits of PT from 19 to 20. aquatic not covered, modalities yes, iontophoresis not covered. Total # of Visits Approved: 12  Total # of Visits to Date: 2  Plan of Care/Certification Expiration Date: 20  Progress Note Counter: 2/10 for PN                Subjective:  Chart Reviewed: Yes  Patient assessed for rehabilitation services?: Yes  Response To Previous Treatment: Patient with no complaints from previous session. Family / Caregiver Present: No  Comments: Follow up with Dr. Rocio Rodríguez on 20   Other (Comment): reason for referral thoracic disc displacement. Subjective: Pt hasn't been taking ant inflammatories this week. I've been sick all week,  Doing HEP 3 x a day- backed off due to increased discomfort. Pain:  Patient Currently in Pain: Yes  Pain Level: 5  Pain Type: Chronic pain  Pain Location: Back  Pain Orientation: Mid      Objective             Exercises  Exercise 1: MRSA in his medical record - contact precaution  Exercise 2: instruction in posture awareness. Use of lumbar roll to correct posture. Did not tolerate this session.    Exercise 3: Manual therapy: working in sagittal plane with thoracic mobility with PA glides with T6 2x  Plan weeks: 8 weeks  Specific instructions for Next Treatment: posture awareness, kinesiotaping, myofascial release to traps and rhomboids. Work on gentle mobilizations in sagittal plane onlyl for now. Monitor symptoms to decrease. posterior shoulder stretch, scap ex for positon/posture. Current Treatment Recommendations: Strengthening, ROM, Manual Therapy - Joint Manipulation, Manual Therapy - Soft Tissue Mobilization, Home Exercise Program, Modalities, Pain Management    Goals:  Patient goals : Patient would like to be able to tolerate sitting/standing better >20 minutes and lift and twist without right mid back pain. Short term goals  Time Frame for Short term goals: 4 weeks  Short term goal 1: Patient to report of decreased pain levels from 7/10 to 2/10 at right interscap region and mid thoracic levels with activities of rotation, and sitting straight. Short term goal 2: Patient to demonstrate improved posture/position awareness with upright standing and sitting posture in session with less forward flexed neck and shoulders/ back. Short term goal 3: Patient to demonstrate increased thoracic ROM with flexion to extension to WNLS, Rotation right and left without pain and WNLS. Short term goal 4: Patient to demonstrate decreased tenderness and tension at right interscap region at rhomboids middle traps from moderate to min tension. Long term goals  Time Frame for Long term goals : 8 weeks  Long term goal 1: Oswestry from 20% to no greater than 10%.    Long term goal 2: Patient independent in HEP with pain management, improved painfree ROM and progression in ex program.      Fe Garcia  [id number]

## 2020-01-03 ENCOUNTER — HOSPITAL ENCOUNTER (OUTPATIENT)
Dept: PHYSICAL THERAPY | Age: 35
Setting detail: THERAPIES SERIES
Discharge: HOME | End: 2020-01-03
Payer: COMMERCIAL

## 2020-01-03 PROCEDURE — 97140 MANUAL THERAPY 1/> REGIONS: CPT

## 2020-01-03 PROCEDURE — 97110 THERAPEUTIC EXERCISES: CPT

## 2020-01-03 ASSESSMENT — PAIN SCALES - GENERAL: PAINLEVEL_OUTOF10: 6

## 2020-01-03 ASSESSMENT — PAIN DESCRIPTION - PAIN TYPE: TYPE: CHRONIC PAIN

## 2020-01-03 ASSESSMENT — PAIN DESCRIPTION - LOCATION: LOCATION: BACK;HEAD

## 2020-01-03 NOTE — PROGRESS NOTES
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 Additional Pertinent Hx:  MRSA contact precaution                  General:  PT Visit Information  Onset Date: 19  PT Insurance Information: [employer] of [address] required, He has been approved for 12 visits of PT from 19 to 20. aquatic not covered, modalities yes, iontophoresis not covered. Total # of Visits Approved: 12  Total # of Visits to Date: 3  Plan of Care/Certification Expiration Date: 20  Progress Note Counter: 3/10 for PN                Subjective:  Chart Reviewed: Yes  Patient assessed for rehabilitation services?: Yes  Response To Previous Treatment: Patient with no complaints from previous session. Family / Caregiver Present: No  Comments: Follow up with Dr. Marshall Notice on 20   Other (Comment): reason for referral thoracic disc displacement. Subjective: Patient reports that he had to sleep on soft couch last night. Notes throbbing headache beginning about 7:30 this morning. It also hurts between shoulder blades. Pain:  Patient Currently in Pain: Yes  Pain Assessment: 0-10  Pain Level: 6(headache 7/10 )  Pain Type: Chronic pain  Pain Location: Back, Head      Objective    Exercises  Exercise 1: MRSA in his medical record - contact precaution  Exercise 2: instruction in posture awareness. Use of lumbar roll to correct posture. Did not tolerate this session.    Exercise 6: Seated with purple ball at low back-x10 reps moving from slouch positon<>to neutral position   Cues to breathe  10 reps sh shrugs, retro rolls and scap retractions with depression. with cues for technique with purple stability ball at low back. Improved sitting posture   Pain still rated   Exercise 7: pec stretch in corner 3x hold 5 count. Repeat 5x  hold 5 count. Exercise 8: neck retraction in standing with back against wall 5x hold 2 seconds. Exercise 9: myofascial release: seated upright leaning over 2 pillows positioned on treatment table: paraspinals, erectae spinae muscles thofacic, lumbar and interscap rhomboid region, upper traps 8 minutes. Activity Tolerance:  Activity Tolerance: Patient limited by pain  Activity Tolerance: Noted decreased mid back pain to 3/10. no headache reported at end of session. Noting improved alignment and posture at end of session. Assessment: Body structures, Functions, Activity limitations: Decreased functional mobility , Decreased ROM, Decreased posture, Increased pain, Decreased high-level IADLs  Assessment: Patient having headache as well as thoracic pain mid back region today. Added pec stretch, neck retraction to correct thoracic alignment and posture. Continued with thoracic extension to neutral. Decreased pain level from 7-8/10 at thoracic spine to 3/10. Headache reduced to 0/10. Noting increased awareness of position alignment and posture in session. Prognosis: Good  REQUIRES PT FOLLOW UP: Yes  Discharge Recommendations: Continue to assess pending progress    Patient Education:  PT Education: Home Exercise Program  Patient Education: Position awareness and posture awareness. Continue HEP with addition of pec stretch and neck retraction to correct alignment and posture. Monitor symptoms following session. Plan:  Times per week: 2x  Plan weeks: 8 weeks  Specific instructions for Next Treatment: posture awareness, kinesiotaping, myofascial release to traps and rhomboids.    Work on gentle mobilizations in sagittal plane onlyl for now. Monitor symptoms to decrease. posterior shoulder stretch, scap ex for positon/posture. Current Treatment Recommendations: Strengthening, ROM, Manual Therapy - Joint Manipulation, Manual Therapy - Soft Tissue Mobilization, Home Exercise Program, Modalities, Pain Management    Goals:  Patient goals : Patient would like to be able to tolerate sitting/standing better >20 minutes and lift and twist without right mid back pain. Short term goals  Time Frame for Short term goals: 4 weeks  Short term goal 1: Patient to report of decreased pain levels from 7/10 to 2/10 at right interscap region and mid thoracic levels with activities of rotation, and sitting straight. Short term goal 2: Patient to demonstrate improved posture/position awareness with upright standing and sitting posture in session with less forward flexed neck and shoulders/ back. Short term goal 3: Patient to demonstrate increased thoracic ROM with flexion to extension to WNLS, Rotation right and left without pain and WNLS. Short term goal 4: Patient to demonstrate decreased tenderness and tension at right interscap region at rhomboids middle traps from moderate to min tension. Long term goals  Time Frame for Long term goals : 8 weeks  Long term goal 1: Oswestry from 20% to no greater than 10%.    Long term goal 2: Patient independent in HEP with pain management, improved painfree ROM and progression in ex program.      Austyn Padilla, [facility]

## 2020-01-06 ENCOUNTER — HOSPITAL ENCOUNTER (OUTPATIENT)
Dept: PHYSICAL THERAPY | Age: 35
Setting detail: THERAPIES SERIES
Discharge: HOME | End: 2020-01-06
Payer: COMMERCIAL

## 2020-01-06 PROCEDURE — 97124 MASSAGE THERAPY: CPT

## 2020-01-06 PROCEDURE — 97110 THERAPEUTIC EXERCISES: CPT

## 2020-01-06 ASSESSMENT — PAIN DESCRIPTION - PAIN TYPE: TYPE: CHRONIC PAIN

## 2020-01-06 ASSESSMENT — PAIN DESCRIPTION - LOCATION: LOCATION: BACK;EAR

## 2020-01-06 ASSESSMENT — PAIN DESCRIPTION - ORIENTATION: ORIENTATION: MID

## 2020-01-06 ASSESSMENT — PAIN SCALES - GENERAL: PAINLEVEL_OUTOF10: 5

## 2020-01-06 NOTE — PROGRESS NOTES
level, T5 level lifting upward on spinous process as patient extends just to neutral and little beyond. x5-6 reps  Cues to relax UTs and breathe naturally    Manual gentle mobs x10 reps 1 set along ~ T2<>T 7   Exercise 4: Trialed gentle B rotation mobilization to right x5 \"a little discomfort. \"  Pain remained 5/10   Exercise 5: instructed in use of ice to mid back interscap region. Also discussed contrast heat/cold and ending with cold to back. Exercise 6: Seated with purple ball at low back-x10 reps moving from slouch positon<>to neutral position   Cues to breathe    Exercise 7: pec stretch in corner 3x hold 15 sec  Exercise 8: neck retraction in standing with back against wall 5x hold 2 seconds. Exercise 9: myofascial release: seated upright leaning over 2 pillows positioned on treatment table: paraspinals, erectae spinae muscles thofacic, lumbar and interscap rhomboid region, upper traps 8 minutes. > restrictions noted R mid thoracic region vs L. Tender with mod pressure, initially. Lessened pressure, but ot asked for more pressure as massage progressed. 2/10 pain after massage. Exercise 10: Ball at head x10 sh shrugs, retro rolls and scap retractions. Cues not to hold breath and gentle ROM-Pt freq grimacing and holding breath         Activity Tolerance:  Activity Tolerance: Patient Tolerated treatment well, Patient limited by pain  Activity Tolerance: 2/10 pain after masage stated. > tightness R mid thoracic region vs L     Assessment: Body structures, Functions, Activity limitations: Decreased functional mobility , Decreased ROM, Decreased posture, Increased pain, Decreased high-level IADLs  Assessment: Pt seems to be improving with posture awareness. Pt was flexed fwd at trunk sitting in lobby, but improved std and sitting posture in clinic. CONSTANT cues not to hold breath. Cues for pain free ROM ONLY. trialed gentle rot to R at thoracic region in sitting. \"I can feel it a little bit. \" Monitor relief from session. Cont heat/ice. Prognosis: Good  REQUIRES PT FOLLOW UP: Yes  Discharge Recommendations: Continue to assess pending progress    Patient Education:  PT Education: Home Exercise Program  Patient Education: Position awareness and posture awareness. Continue HEP/Proper technuique and  correct alignment and posture. Monitor symptoms following session. Ice/heat  Contact Dr, if R earache continues. Plan:  Times per week: 2x  Plan weeks: 8 weeks  Specific instructions for Next Treatment: posture awareness, kinesiotaping, myofascial release to traps and rhomboids. Work on gentle mobilizations in sagittal plane onlyl for now. Monitor symptoms to decrease. posterior shoulder stretch, scap ex for positon/posture. Current Treatment Recommendations: Strengthening, ROM, Manual Therapy - Joint Manipulation, Manual Therapy - Soft Tissue Mobilization, Home Exercise Program, Modalities, Pain Management    Goals:  Patient goals : Patient would like to be able to tolerate sitting/standing better >20 minutes and lift and twist without right mid back pain. Short term goals  Time Frame for Short term goals: 4 weeks  Short term goal 1: Patient to report of decreased pain levels from 7/10 to 2/10 at right interscap region and mid thoracic levels with activities of rotation, and sitting straight. Short term goal 2: Patient to demonstrate improved posture/position awareness with upright standing and sitting posture in session with less forward flexed neck and shoulders/ back. Short term goal 3: Patient to demonstrate increased thoracic ROM with flexion to extension to WNLS, Rotation right and left without pain and WNLS. Short term goal 4: Patient to demonstrate decreased tenderness and tension at right interscap region at rhomboids middle traps from moderate to min tension. Long term goals  Time Frame for Long term goals : 8 weeks  Long term goal 1: Oswestry from 20% to no greater than 10%.

## 2020-01-07 ENCOUNTER — HOSPITAL ENCOUNTER (EMERGENCY)
Age: 35
Discharge: HOME | End: 2020-01-07

## 2020-01-07 VITALS
TEMPERATURE: 98 F | OXYGEN SATURATION: 97 % | WEIGHT: 210 LBS | DIASTOLIC BLOOD PRESSURE: 85 MMHG | BODY MASS INDEX: 30.06 KG/M2 | HEART RATE: 73 BPM | RESPIRATION RATE: 16 BRPM | SYSTOLIC BLOOD PRESSURE: 146 MMHG | HEIGHT: 70 IN

## 2020-01-07 PROCEDURE — 99213 OFFICE O/P EST LOW 20 MIN: CPT | Performed by: NURSE PRACTITIONER

## 2020-01-07 PROCEDURE — 99212 OFFICE O/P EST SF 10 MIN: CPT

## 2020-01-07 RX ORDER — AMOXICILLIN 500 MG/1
500 CAPSULE ORAL 3 TIMES DAILY
Qty: 30 CAPSULE | Refills: 0 | Status: SHIPPED | OUTPATIENT
Start: 2020-01-07 | End: 2020-01-17

## 2020-01-07 RX ORDER — IBUPROFEN 200 MG
600 TABLET ORAL EVERY 6 HOURS PRN
Status: ON HOLD | COMMUNITY
End: 2021-07-06 | Stop reason: HOSPADM

## 2020-01-07 ASSESSMENT — PAIN DESCRIPTION - ORIENTATION: ORIENTATION: RIGHT;LEFT

## 2020-01-07 ASSESSMENT — ENCOUNTER SYMPTOMS
SINUS PRESSURE: 1
NAUSEA: 0
VOMITING: 0
SORE THROAT: 1
COUGH: 1
SHORTNESS OF BREATH: 0

## 2020-01-07 ASSESSMENT — PAIN DESCRIPTION - LOCATION: LOCATION: EAR

## 2020-01-07 ASSESSMENT — PAIN SCALES - GENERAL: PAINLEVEL_OUTOF10: 10

## 2020-01-07 NOTE — ED PROVIDER NOTES
[facility]       CHIEF COMPLAINT       Chief Complaint   Patient presents with    Otalgia     right ear the worse and can't hear well    Sinusitis     stuffed up head and a little cough       Nurses Notes reviewed and I agree except as noted in the HPI. HISTORY OF PRESENT ILLNESS   Andrew Hendricks is a 30 y.o. male who presents for evaluation of right ear pain that is been ongoing for the past 3 days. Patient states he is also beginning to have left ear pain as well. States that he has had sinus pressure/congestion for the same amount of time. He denies any fevers but states that he has been taking ibuprofen at home. Reports that he is also been putting hydrogen peroxide in his right ear. Patient states that he does not have insurance and therefore has been waiting to be seen due to cost.    The history is provided by the patient. REVIEW OF SYSTEMS     Review of Systems   Constitutional: Negative for chills and fever. HENT: Positive for congestion, ear pain, sinus pressure and sore throat. Respiratory: Positive for cough. Negative for shortness of breath. Cardiovascular: Negative for chest pain. Gastrointestinal: Negative for nausea and vomiting. Musculoskeletal: Negative for arthralgias and myalgias. Skin: Negative for rash. Allergic/Immunologic: Negative for environmental allergies. Neurological: Negative for headaches. PAST MEDICAL HISTORY         Diagnosis Date    Staph infection 2014       SURGICALHISTORY     Patient  has a past surgical history that includes Appendectomy and Dental surgery. CURRENT MEDICATIONS       Previous Medications    IBUPROFEN (ADVIL;MOTRIN) 200 MG TABLET    Take 600 mg by mouth every 6 hours as needed for Pain       ALLERGIES     Patient is has No Known Allergies. Patients   There is no immunization history on file for this patient.     FAMILY HISTORY     Patient's family history includes TempSrc: Infrared   SpO2: 97%   Weight: 210 lb (95.3 kg)   Height: 5' 10\" (1.778 m)       Medications - No data to display         PROCEDURES:  None    FINAL IMPRESSION      1. Acute suppurative otitis media of right ear with spontaneous rupture of tympanic membrane, recurrence not specified    2. Acute suppurative otitis media of left ear without spontaneous rupture of tympanic membrane, recurrence not specified          DISPOSITION/ PLAN     Right TM does appear to be perforated at this time and an infection is noted to the left ear as well. Discussed with the patient the plan to treat with amoxicillin as he request \"cheap\" antibiotics due to his lack of insurance. He also given a prescription for antibiotic drops is advised that he must follow-up with a primary care provider for reevaluation of this year. He is given information for the residency clinic and appointment is scheduled for 3 days from now. Patient states that he will maintain follow-up. PATIENT REFERRED TO:  No primary care provider on file. No primary physician on file.       DISCHARGE MEDICATIONS:  New Prescriptions    AMOXICILLIN (AMOXIL) 500 MG CAPSULE    Take 1 capsule by mouth 3 times daily for 10 days       Discontinued Medications    IBUPROFEN (ADVIL;MOTRIN) 800 MG TABLET    Take 800 mg by mouth every 6 hours as needed for Pain       Current Discharge Medication List          GAVI Barbosa CNP    (Please note that portions of this note were completed with a voice recognition program. Efforts were made to edit the dictations but occasionally words are mis-transcribed.)          GAVI Barbosa CNP  01/07/20 7170

## 2020-01-07 NOTE — ED NOTES
Pt. Released in stable condition, ambulated per self to private car. Instructed pt to follow-up with family doctor as needed for recheck or go directly to the emergency department for any concerns/worsening conditions. Pt. Verbalized understanding of instructions. No questions at this time. RX in hand.       kSyla Szymanski RN  01/07/20 [id number]

## 2020-01-09 ENCOUNTER — HOSPITAL ENCOUNTER (OUTPATIENT)
Dept: PHYSICAL THERAPY | Age: 35
Setting detail: THERAPIES SERIES
Discharge: HOME | End: 2020-01-09
Payer: COMMERCIAL

## 2020-01-09 PROCEDURE — 97110 THERAPEUTIC EXERCISES: CPT

## 2020-01-09 ASSESSMENT — PAIN DESCRIPTION - PAIN TYPE: TYPE: CHRONIC PAIN

## 2020-01-09 ASSESSMENT — PAIN SCALES - GENERAL: PAINLEVEL_OUTOF10: 4

## 2020-01-09 ASSESSMENT — PAIN DESCRIPTION - LOCATION: LOCATION: BACK

## 2020-01-09 ASSESSMENT — PAIN DESCRIPTION - ORIENTATION: ORIENTATION: MID

## 2020-01-09 NOTE — PROGRESS NOTES
region. Also discussed contrast heat/cold and ending with cold to back. Exercise 6: Seated with purple ball at low back-x10 reps moving from slouch positon<>to neutral position   Cues to breathe    Exercise 7: pec stretch in corner 3x hold 15 sec  Exercise 8: neck retraction in sitting today-cues NOT to protrude head fwd. (NT> standing with back against wall 5x hold 2 seconds.)  Exercise 10: Ball at head x15 sh shrugs, retro rolls and scap retractions. x10 3 way shoulder 0 wt  Cues not to hold breath and gentle ROM    Exercise 11: hydrochest L-4 x10 pillow at head   Exercise 12: Hydro stick x 10 4 dir cerv neutral  make sure blinds are up-\"making me dizzy. \"   Exercise 13: Began session with  RPM x 2 min retro  cerv neutral-no increased pain  Exercise 14: peach band x 10 sh rows/ext and single arm horiz abd  occ tactile and verbal cues for technique  no increased pain  fairly good cerv neutral          Activity Tolerance:  Activity Tolerance: Patient Tolerated treatment well  Activity Tolerance: Pain same after session  4/10  Monitor resposne to progression of gentle strenthening exs. Improving posture, but still requires cues not to slouch in sitting    Assessment: Body structures, Functions, Activity limitations: Decreased functional mobility , Decreased ROM, Decreased posture, Increased pain, Decreased high-level IADLs  Assessment: Pt continues to be improving with posture awareness. Improving AROM with gentle trunl rot to R with little to no guarding. Progressed gentle strengtheing with cerv neutral. Cues for breathing technques, but less cues required. Use ice/heat at home. Monitor resposne to session. Prognosis: Good  REQUIRES PT FOLLOW UP: Yes  Discharge Recommendations: Continue to assess pending progress    Patient Education:  PT Education: Home Exercise Program  Patient Education: Position awareness and posture awareness. Continue HEP/Proper technuique and  correct alignment and posture. Monitor symptoms following session. Ice/heat                      Plan:  Times per week: 2x  Plan weeks: 8 weeks  Specific instructions for Next Treatment: posture awareness, kinesiotaping, myofascial release to traps and rhomboids. Work on gentle mobilizations in sagittal plane onlyl for now. Monitor symptoms to decrease. posterior shoulder stretch, scap ex for positon/posture. Current Treatment Recommendations: Strengthening, ROM, Manual Therapy - Joint Manipulation, Manual Therapy - Soft Tissue Mobilization, Home Exercise Program, Modalities, Pain Management    Goals:  Patient goals : Patient would like to be able to tolerate sitting/standing better >20 minutes and lift and twist without right mid back pain. Short term goals  Time Frame for Short term goals: 4 weeks  Short term goal 1: Patient to report of decreased pain levels from 7/10 to 2/10 at right interscap region and mid thoracic levels with activities of rotation, and sitting straight. Short term goal 2: Patient to demonstrate improved posture/position awareness with upright standing and sitting posture in session with less forward flexed neck and shoulders/ back. Short term goal 3: Patient to demonstrate increased thoracic ROM with flexion to extension to WNLS, Rotation right and left without pain and WNLS. Short term goal 4: Patient to demonstrate decreased tenderness and tension at right interscap region at rhomboids middle traps from moderate to min tension. Long term goals  Time Frame for Long term goals : 8 weeks  Long term goal 1: Oswestry from 20% to no greater than 10%.    Long term goal 2: Patient independent in HEP with pain management, improved painfree ROM and progression in ex program.      Clare Oconnell  [id number]

## 2020-01-10 ENCOUNTER — OFFICE VISIT (OUTPATIENT)
Dept: FAMILY MEDICINE CLINIC | Age: 35
End: 2020-01-10

## 2020-01-10 VITALS
WEIGHT: 243 LBS | OXYGEN SATURATION: 98 % | BODY MASS INDEX: 34.79 KG/M2 | TEMPERATURE: 97.4 F | SYSTOLIC BLOOD PRESSURE: 126 MMHG | DIASTOLIC BLOOD PRESSURE: 80 MMHG | HEIGHT: 70 IN | HEART RATE: 62 BPM

## 2020-01-10 PROCEDURE — 99203 OFFICE O/P NEW LOW 30 MIN: CPT | Performed by: STUDENT IN AN ORGANIZED HEALTH CARE EDUCATION/TRAINING PROGRAM

## 2020-01-10 RX ORDER — OFLOXACIN 3 MG/ML
5 SOLUTION AURICULAR (OTIC) 2 TIMES DAILY
Qty: 10 ML | Refills: 0 | Status: SHIPPED | OUTPATIENT
Start: 2020-01-10 | End: 2020-01-20

## 2020-01-10 ASSESSMENT — ENCOUNTER SYMPTOMS
BLOOD IN STOOL: 0
VOMITING: 0
ABDOMINAL PAIN: 0
FACIAL SWELLING: 0
DIARRHEA: 0
SHORTNESS OF BREATH: 0
SINUS PRESSURE: 0
SINUS PAIN: 0
CONSTIPATION: 0
NAUSEA: 0
COUGH: 0
TROUBLE SWALLOWING: 0
EYE PAIN: 0

## 2020-01-10 ASSESSMENT — PATIENT HEALTH QUESTIONNAIRE - PHQ9
2. FEELING DOWN, DEPRESSED OR HOPELESS: 0
SUM OF ALL RESPONSES TO PHQ9 QUESTIONS 1 & 2: 0
SUM OF ALL RESPONSES TO PHQ QUESTIONS 1-9: 0
1. LITTLE INTEREST OR PLEASURE IN DOING THINGS: 0
SUM OF ALL RESPONSES TO PHQ QUESTIONS 1-9: 0

## 2020-01-10 NOTE — PROGRESS NOTES
performed key portions of the examination. I agree with the documented assessment and plan as documented by the resident.   GE modifier added to this encounter      Radha Harris DO 1/10/2020 10:23 AM

## 2020-01-10 NOTE — PROGRESS NOTES
[address] [facility] [address] [address]  Dept: [phone]  Dept Fax: [phone]  Loc: [phone]      Maria R Pinto is a 30 y.o. male who presents today for:  Chief Complaint   Patient presents with   Pau Millan New Doctor    ED Follow-up     feeling better    Other     STAPH INFECTION hx, does he still have MRSA? Goals      Exercise 3x per week (30 min per time)           HPI:     Maria R Pinto is a 30 y.o. male who has a past medical history of Staph infection (2014) who presents today for ED follow up. States that he is feeling better. Pain is improving. Sill can not hear very well. States that he has had frequent ear infections his entire life but usually does not go to physician for them. This time he states that the pain was unbearable and he could no sleep. Given amoxicillin and symptoms seem to be improving. Taking ibuprofen for pain, which controls the pain. Felt warm 1 of the days. States that he had some watery ear wax on the right side a few days ago. On workers comp due to back injury when bar fell on his head. Has no insurance currently. Going to physical therapy.          Past Medical History:   Diagnosis Date    Staph infection 2014      Past Surgical History:   Procedure Laterality Date    APPENDECTOMY      DENTAL SURGERY      WISDOM TOOTH EXTRACTION       Family History   Problem Relation Age of Onset    High Blood Pressure Father     Asthma Father      Social History     Tobacco Use    Smoking status: Current Every Day Smoker     Packs/day: 0.50     Years: 13.00     Pack years: 6.50     Types: Cigarettes     Start date: 1/10/2007    Smokeless tobacco: Former User     Types: Chew   Substance Use Topics    Alcohol use: No      Current Outpatient Medications   Medication Sig Dispense Refill    ofloxacin (FLOXIN) 0.3 % otic solution Place 5 drops into the right ear 2 times daily for 10 days 10 mL 0    ibuprofen (ADVIL;MOTRIN) 200 MG tablet Take 600 mg by mouth every 6 hours as needed for Pain      amoxicillin (AMOXIL) 500 MG capsule Take 1 capsule by mouth 3 times daily for 10 days 30 capsule 0    Meloxicam (MOBIC PO) Take by mouth       No current facility-administered medications for this visit. No Known Allergies    Health Maintenance   Topic Date Due    Varicella Vaccine (1 of 2 - 2-dose childhood series) 11/26/1988    Pneumococcal 0-64 years Vaccine (1 of 1 - PPSV23) 11/26/1993    DTaP/Tdap/Td vaccine (1 - Tdap) 11/26/1998    HIV screen  11/26/2002    Flu vaccine (1) 09/01/2019       Subjective:      Review of Systems   Constitutional: Negative for chills, fatigue and fever. HENT: Positive for ear pain and hearing loss (muffled right side). Negative for ear discharge, facial swelling, postnasal drip, sinus pressure, sinus pain and trouble swallowing. Eyes: Negative for pain and visual disturbance. Respiratory: Negative for cough and shortness of breath. Cardiovascular: Negative for chest pain and palpitations. Gastrointestinal: Negative for abdominal pain, blood in stool, constipation, diarrhea, nausea and vomiting. Genitourinary: Negative for dysuria. Skin: Negative for rash. Neurological: Negative for weakness and headaches. Psychiatric/Behavioral: Negative for dysphoric mood. The patient is not nervous/anxious. Objective:     Vitals:    01/10/20 0911   BP: 126/80   Pulse: 62   Temp: 97.4 °F (36.3 °C)   TempSrc: Oral   SpO2: 98%   Weight: 243 lb (110.2 kg)   Height: 5' 10.08\" (1.78 m)       Body mass index is 34.79 kg/m². Wt Readings from Last 3 Encounters:   01/10/20 243 lb (110.2 kg)   01/07/20 210 lb (95.3 kg)   05/17/19 220 lb (99.8 kg)     BP Readings from Last 3 Encounters:   01/10/20 126/80   01/07/20 (!) 146/85   05/17/19 124/82       Physical Exam  Vitals signs and nursing note reviewed.    Constitutional:       General: He is not in acute distress. Appearance: He is well-developed. He is not diaphoretic. HENT:      Head: Normocephalic and atraumatic. Right Ear: External ear normal. Decreased hearing noted. A middle ear effusion is present. Tympanic membrane is perforated, erythematous and bulging. Left Ear: External ear normal. Tympanic membrane is erythematous and bulging. Tympanic membrane is not perforated. Nose: Nose normal.   Eyes:      General: No scleral icterus. Right eye: No discharge. Left eye: No discharge. Conjunctiva/sclera: Conjunctivae normal.   Neck:      Musculoskeletal: Normal range of motion. Cardiovascular:      Rate and Rhythm: Normal rate and regular rhythm. Heart sounds: Normal heart sounds. No murmur. Pulmonary:      Effort: Pulmonary effort is normal.      Breath sounds: Normal breath sounds. Skin:     General: Skin is warm and dry. Findings: No erythema or rash. Neurological:      Mental Status: He is alert and oriented to person, place, and time. Cranial Nerves: No cranial nerve deficit. Psychiatric:         Behavior: Behavior normal.         Thought Content: Thought content normal.         Judgment: Judgment normal.         Lab Results   Component Value Date    WBC 9.9 11/07/2017    HGB 17.8 11/07/2017    HCT 50.3 11/07/2017     11/07/2017     11/07/2017    K 4.0 11/07/2017     11/07/2017    CREATININE 0.6 11/07/2017    BUN 11 11/07/2017    CO2 26 11/07/2017    LABGLOM >90 11/07/2017    CALCIUM 8.5 11/07/2017       Imaging Results:    No results found. Assessment/Plan:     [place] was seen today for established new doctor, ed follow-up and other.     Diagnoses and all orders for this visit:    Recurrent acute suppurative otitis media of right ear with spontaneous rupture of tympanic membrane  -Improving   -Will have patient continue amoxicillin until gone  -Will give coupon for otic drops today  -     ofloxacin (FLOXIN) 0.3 % otic solution; Place 5 drops into the right ear 2 times daily for 10 days    Recurrent acute suppurative otitis media without spontaneous rupture of left tympanic membrane  -Improving  -Continue amoxicillin               No follow-ups on file. Medications Prescribed:  Orders Placed This Encounter   Medications    ofloxacin (FLOXIN) 0.3 % otic solution     Sig: Place 5 drops into the right ear 2 times daily for 10 days     Dispense:  10 mL     Refill:  0       Future Appointments   Date Time Provider [place]   1/13/2020  8:30 AM Nighat Parmar, PT [facility] [place] [facility]   1/16/2020  8:00 AM Nighat Ghulam, PT [facility] [place] [address]   1/20/2020  8:00 AM Nighat Ghulam, PT [facility] [place] [facility] [facility]       Patient given educational materials - see patient instructions. Discussed use, benefit, and sideeffects of prescribed medications. All patient questions answered. Pt voiced understanding. Reviewed health maintenance. Instructed to continue current medications, diet and exercise. Patient agreed with treatment plan. Follow up as directed.      Electronically signed by Tony Chacko DO on 1/10/2020 at 9:53 AM

## 2020-01-10 NOTE — PATIENT INSTRUCTIONS
Thank you   1. Thank you for trusting us with your healthcare needs. You may receive a survey regarding today's visit. It would help us out if you would take a few moments to provide your feedback. We value your input. 2. Please bring in ALL medications BOTTLES, including inhalers, herbal supplements, over the counter, prescribed & non-prescribed medicine. The office would like actual medication bottles and a list.   3. Please note our OFFICE POLICIES:   a. Prior to getting your labs drawn, please check with your insurance company for benefits and eligibility of lab services. Often, insurance companies cover certain tests for preventative visits only. It is patient's responsibility to see what is covered. b. We are unable to change a diagnosis after the test has been performed. c. Lab orders will not be re-printed. Please hold onto your original lab orders and take them to your lab to be completed. d. If you no show your scheduled appointment three times, you will be dismissed from this practice. e. Eusebio Sox must be completed 24 hours prior to your schedule appointment. 4. If the list below has been completed, PLEASE FAX RECORDS TO OUR OFFICE @ [phone].  Once the records have been received we will update your records at our office:  Health Maintenance Due   Topic Date Due    Varicella Vaccine (1 of 2 - 2-dose childhood series) 11/26/1988    Pneumococcal 0-64 years Vaccine (1 of 1 - PPSV23) 11/26/1993    DTaP/Tdap/Td vaccine (1 - Tdap) 11/26/1998    HIV screen  11/26/2002    Flu vaccine (1) 09/01/2019

## 2020-01-13 ENCOUNTER — HOSPITAL ENCOUNTER (OUTPATIENT)
Dept: PHYSICAL THERAPY | Age: 35
Setting detail: THERAPIES SERIES
Discharge: HOME | End: 2020-01-13
Payer: COMMERCIAL

## 2020-01-13 PROCEDURE — 97110 THERAPEUTIC EXERCISES: CPT

## 2020-01-13 ASSESSMENT — PAIN SCALES - GENERAL: PAINLEVEL_OUTOF10: 5

## 2020-01-13 NOTE — PROGRESS NOTES
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 Additional Pertinent Hx:  MRSA contact precaution       General:  PT Visit Information  Onset Date: 19  PT Insurance Information: [employer] of [address] required, He has been approved for 12 visits of PT from 19 to 20. aquatic not covered, modalities yes, iontophoresis not covered. Total # of Visits Approved: 12  Total # of Visits to Date: 6  Plan of Care/Certification Expiration Date: 20  Progress Note Counter: 6/10 for PN              Subjective:  Chart Reviewed: Yes  Patient assessed for rehabilitation services?: Yes  Family / Caregiver Present: No  Comments: Follow up with Dr. Margot Brady on 20   Other (Comment): reason for referral thoracic disc displacement. Subjective: Patient states he does have a headache today but thinks that is from his busted ear drum. Patient is questioning if he should go back to get injections again. Patient reports he felt ok after progressions last therapy session. Pain:  Patient Currently in Pain: Yes  Pain Assessment: 0-10  Pain Level: 5(4-5/10 Thoracic region)    Objective    Exercises  Exercise 1: MRSA in his medical record - contact precaution  Exercise 2: Reviewed instruction in posture awareness. Use of lumbar roll to correct posture.    Exercise 4: Gentle Bilateral rotation mobilization to right x5 little to no guarding today, more fluent motion no increased pain stated   Exercise 6: Seated with purple ball at

## 2020-01-16 ENCOUNTER — HOSPITAL ENCOUNTER (OUTPATIENT)
Dept: PHYSICAL THERAPY | Age: 35
Setting detail: THERAPIES SERIES
Discharge: HOME | End: 2020-01-16
Payer: COMMERCIAL

## 2020-01-16 PROCEDURE — 97110 THERAPEUTIC EXERCISES: CPT

## 2020-01-16 ASSESSMENT — PAIN DESCRIPTION - ORIENTATION: ORIENTATION: MID

## 2020-01-16 ASSESSMENT — PAIN SCALES - GENERAL: PAINLEVEL_OUTOF10: 5

## 2020-01-16 ASSESSMENT — PAIN DESCRIPTION - LOCATION: LOCATION: BACK

## 2020-01-16 NOTE — PROGRESS NOTES
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 Additional Pertinent Hx:  MRSA contact precaution                  General:  PT Visit Information  Onset Date: 19  PT Insurance Information: [employer] of [address] required, He has been approved for 12 visits of PT from 19 to 20. aquatic not covered, modalities yes, iontophoresis not covered. Total # of Visits Approved: 12  Total # of Visits to Date: 7  Plan of Care/Certification Expiration Date: 20  Progress Note Counter: 6/10 for PN                Subjective:  Chart Reviewed: Yes  Patient assessed for rehabilitation services?: Yes  Family / Caregiver Present: No  Comments: Follow up with Dr. Sarah Lin on 20   Other (Comment): reason for referral thoracic disc displacement. Subjective: Patient states he tolerated the ex fine last session with exception of shoulder blade pinches. Noting pain 4-5/10 mid thoracic      Pain:  Patient Currently in Pain: Yes  Pain Assessment: 0-10  Pain Level: 5  Pain Location: Back  Pain Orientation: Mid  Pain Radiating Towards: mid back T5, T4 level today. Objective    Exercises  Exercise 1: MRSA in his medical record - contact precaution  Exercise 6: seated right rotation with slight sidebend x5. posterior shoulder stretch with hands clasped x5 hold 2-3 counts  Exercise 7: Pec stretch in doorway 5x hold 15 sec  Exercise 8: Neck retraction in sitting 10x - cues NOT to protrude head forward.   Exercise 11: HydroChest L-3 x15 pillow at head   Exercise 12: HydroStick forward/back, diagonals to right and diagonals to left  10x each with cervical neutral  (make sure blinds are up because patietn gets dizzy)  Exercise 13: Began session with BIODEX in OT  RPM x 2 min retro/forward changing every 30 seconds. cerv neutral-no increased pain  Exercise 14: peach band x 10 sh rows/ext and bilateral  arm horiz abd  occ tactile and verbal cues for technique  no increased pain  Exercise 15: desk push ups x10  (50%)  range          Activity Tolerance:  Activity Tolerance: Patient Tolerated treatment well  Activity Tolerance: Pain level decreased to 4/10 mid thoracic. posterior scap stretch for rhomboids felt good. Limiting ROM with ex to avoid pain in thoracic region with cuing during ex. Assessment: Body structures, Functions, Activity limitations: Decreased functional mobility , Decreased ROM, Decreased posture, Increased pain, Decreased high-level IADLs  Assessment: Progressing with ex. Modified ex to painfree range only. Added wall pushup, right trunk rotation with slight SB, NOting less gaurding and improved postural alignment in session. Prognosis: Good  REQUIRES PT FOLLOW UP: Yes  Discharge Recommendations: Continue to assess pending progress    Patient Education:  PT Education: Home Exercise Program  Patient Education: Issued HEP of resistance band ex, wall push up and posterior shoulder/scap stretch. Plan:  Times per week: 2x  Plan weeks: 8 weeks  Specific instructions for Next Treatment: posture awareness, kinesiotaping, myofascial release to traps and rhomboids. Work on gentle mobilizations in sagittal plane onlyl for now. Monitor symptoms to decrease. posterior shoulder stretch, scap ex for positon/posture.    Current Treatment Recommendations: Strengthening, ROM, Manual Therapy - Joint Manipulation, Manual Therapy - Soft Tissue Mobilization, Home Exercise Program, Modalities, Pain

## 2020-01-20 ENCOUNTER — HOSPITAL ENCOUNTER (OUTPATIENT)
Dept: PHYSICAL THERAPY | Age: 35
Setting detail: THERAPIES SERIES
Discharge: HOME | End: 2020-01-20
Payer: COMMERCIAL

## 2020-01-20 PROCEDURE — 97110 THERAPEUTIC EXERCISES: CPT

## 2020-01-20 ASSESSMENT — PAIN SCALES - GENERAL: PAINLEVEL_OUTOF10: 4

## 2020-01-20 NOTE — PROGRESS NOTES
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 Additional Pertinent Hx:  MRSA contact precaution                  General:  PT Visit Information  Onset Date: 19  PT Insurance Information: [employer] of [address] required, He has been approved for 12 visits of PT from 19 to 20. aquatic not covered, modalities yes, iontophoresis not covered. Total # of Visits Approved: 12  Total # of Visits to Date: 8  Plan of Care/Certification Expiration Date: 20  Progress Note Counter:  for PN                Subjective:  Chart Reviewed: Yes  Patient assessed for rehabilitation services?: Yes  Family / Caregiver Present: No  Comments: Follow up with Dr. Landon Alex on 20   Other (Comment): reason for referral thoracic disc displacement. Subjective: Patient feels there has been a little bit of improvement with less pain at mid back. Frequency remains the same. Average pain level 4-5/10. Sometimes can increase to 6./10 Continues to have limited range with twisting and straightening his spine. Not returned back to work yet. Pain:  Patient Currently in Pain: Yes  Pain Assessment: 0-10  Pain Level: 4  Pain Radiating Towards: mid thoracic T4, T5 region.        Objective    Exercises  Exercise 1: MRSA in his medical record - contact precaution  Exercise 2: Reassessment 20 Oswestry 26% rotation right and left 45 degrees end range pain note right rotation 10-20% more limited than left rotation. , See goal summary for status. Exercise 7: Pec stretch in doorway 5x hold 15 sec  Exercise 8: Neck retraction in sitting 10x - cues NOT to protrude head forward. Exercise 11: HydroChest L-3 x15   Exercise 12: HydroStick forward/back, diagonals to right and diagonals to left  10x each with cervical neutral  (make sure blinds are up because patietn gets dizzy)  Exercise 13: BIODEX in OT  RPM x 2 min retro/forward changing every 30 seconds. cerv neutral-no increased pain (end of session toda due to reassessment)    Exercise 14: peach band x 10 sh rows/ext and bilateral  arm horiz abd, bilateral ER x10   occ tactile and verbal cues for technique  no increased pain  Exercise 15: desk push ups x10  (50%)  range          Activity Tolerance:  Activity Tolerance: Patient Tolerated treatment well  Activity Tolerance: Pain level remained 4/10 throughout session at mid thoracic region. .Continue to limit ROM through painfree range in rotation and thoracic extension. Assessment: Body structures, Functions, Activity limitations: Decreased functional mobility , Decreased ROM, Decreased posture, Increased pain, Decreased high-level IADLs  Assessment: Reassessment today. Refer to goal summary for status. Joshuamayo Mitchell is progressing with ex in clinic and noting improved postuer/position awarenss. ROtation at thoracic spine improving with right rotation 20% difference as compared to left rotation. Pain more localized at right thoracic and mid spine. Will continue PT to address pain, thoracic ROM and scap and strengthening ex thoracic region. Prognosis: Good  REQUIRES PT FOLLOW UP: Yes  Discharge Recommendations: Continue to assess pending progress    Patient Education:  PT Education: Home Exercise Program  Patient Education: Continue HEP as instructed NO changes from last session.                         Plan:  Times per week: 2x  Plan weeks: 8 weeks  Specific instructions for Next Treatment: posture

## 2020-01-23 ENCOUNTER — HOSPITAL ENCOUNTER (OUTPATIENT)
Dept: PHYSICAL THERAPY | Age: 35
Setting detail: THERAPIES SERIES
Discharge: HOME | End: 2020-01-23
Payer: COMMERCIAL

## 2020-01-23 PROCEDURE — 97110 THERAPEUTIC EXERCISES: CPT

## 2020-01-23 ASSESSMENT — PAIN DESCRIPTION - ORIENTATION: ORIENTATION: LEFT;RIGHT;MID

## 2020-01-23 ASSESSMENT — PAIN SCALES - GENERAL: PAINLEVEL_OUTOF10: 4

## 2020-01-23 ASSESSMENT — PAIN DESCRIPTION - LOCATION: LOCATION: BACK

## 2020-01-23 NOTE — PROGRESS NOTES
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 Additional Pertinent Hx:  MRSA contact precaution                  General:  PT Visit Information  Onset Date: 19  PT Insurance Information: [employer] of [address] required, He has been approved for 12 visits of PT from 19 to 20. aquatic not covered, modalities yes, iontophoresis not covered. Total # of Visits Approved: 12  Total # of Visits to Date: 9  Plan of Care/Certification Expiration Date: 20  Progress Note Counter: 1/3  for PN                Subjective:  Chart Reviewed: Yes  Patient assessed for rehabilitation services?: Yes  Response To Previous Treatment: Patient with no complaints from previous session. Family / Caregiver Present: No  Comments: Follow up with Dr. Landon Alex on 20   Other (Comment): reason for referral thoracic disc displacement. Subjective: Pt still rates pain moderate. \"Is it common to chnage sides. It hurts more on the L now, too.' I see the Dr on Feb- pain management. Pain:  Patient Currently in Pain: Yes  Pain Level: 4  Pain Location: Back  Pain Orientation: Left, Right, Mid      Objective             Exercises  Exercise 1: MRSA in his medical record - contact precaution  Exercise 7: Pec stretch in doorway 5x hold 15 sec  Exercise 8: Neck retraction in sitting 10x - cues NOT to protrude head forward. Exercise 10: Cervical stabs with ball at head shoulder shrugs, retro rolls and scap retractions.   x15 3 way shoulder 0 wt x15 Cues not to hold breath and gentle ROM   Exercise 11: HydroChest L-4  2x10 pillow at head   Exercise 12: HydroStick forward/back, diagonals to right and diagonals to left  10x each with cervical neutral  (make sure blinds are up because patietn gets dizzy)  Exercise 13: Warm  BIODEX in OT  RPM x 2 min retro/forward changing every 30 seconds. cerv neutral-no increased pain  Exercise 14: peach band x 15 sh rows/ext and bilateral  arm horiz abd, bilateral ER x10   occ tactile and verbal cues for technique  no increased pain  Exercise 15: desk push ups x10  (50%)  range   Exercise 16: Total Gym  prone-L-H x10 B sh flex, scaption, horiz abd, sh ext., rows  0 wt towel roll at head         Activity Tolerance:  Activity Tolerance: Patient Tolerated treatment well  Activity Tolerance: Pain level remained 4/10 throughout session at mid thoracic region. Pain free ROM through painfree with all exs. Assessment: Body structures, Functions, Activity limitations: Decreased functional mobility , Decreased ROM, Decreased posture, Increased pain, Decreased high-level IADLs  Assessment: Pt \"concerned his back is crooked. \" Pt showing diagnostic picture from phone. Politely listened and instructed pt to ask for referal to orthopedic spine specialist. Pt seemed to understand. Pain free ROM with exs. Monitor response to session. Prognosis: Good  REQUIRES PT FOLLOW UP: Yes  Discharge Recommendations: Continue to assess pending progress    Patient Education:  PT Education: Home Exercise Program  Patient Education: Continue HEP, monitor resposne to session. Plan:  Times per week: 2x  Plan weeks: 8 weeks  Specific instructions for Next Treatment: posture awareness, kinesiotaping, myofascial release to traps and rhomboids. Work on gentle mobilizations in sagittal plane onlyl for now. Monitor symptoms to decrease. posterior shoulder stretch, scap ex for positon/posture.    Current Treatment Recommendations: Strengthening, ROM, Manual Therapy - Joint Manipulation, Manual Therapy - Soft Tissue Mobilization, Home Exercise Program, Modalities, Pain Management    Goals:  Patient goals : Patient would like to be able to tolerate sitting/standing better >20 minutes and lift and twist without right mid back pain. GOAL being met/in progress Notes able to rotate trunk further without pain and pain level less compared to when he started PT. He is able to tolerate sitting 20 minutes. Short term goals  Time Frame for Short term goals: 4 weeks 1/20/20   Short term goal 1: Patient to report of decreased pain levels from 7/10 to 2/10 at right interscap region and mid thoracic levels with activities of rotation, and sitting straight. GOAL IN PROGRESS and being MET Noting pain level decreased to 4-5/10 region and more center of spine now instead of to the right T4T5 levels mid spine . Rotation improving and able to tolerate upright posture. Short term goal 2: Patient to demonstrate improved posture/position awareness with upright standing and sitting posture in session with less forward flexed neck and shoulders/ back. GOAL BEING MET Cues remain for posture but it is improving. Continue to slouch at times in sitting positoin  Short term goal 3: Patient to demonstrate increased thoracic ROM with flexion to extension to WNLS, Rotation right and left without pain and WNLS. GOAL NOT MET Rotation not to 45 degrees right and left limited due to end range pain  Short term goal 4: Patient to demonstrate decreased tenderness and tension at right interscap region at rhomboids middle traps from moderate to min tension. GOAL in progress and noting less tenderness interscap rhomboid region. MOre localized right transvers process region instead of at rhomboid region, T4T5      Long term goals  Time Frame for Long term goals : 8 weeks  Long term goal 1: Oswestry from 20% to no greater than 10%. GOAL NOT MET Oswestry 26%.

## 2020-01-27 ENCOUNTER — HOSPITAL ENCOUNTER (OUTPATIENT)
Dept: PHYSICAL THERAPY | Age: 35
Setting detail: THERAPIES SERIES
Discharge: HOME | End: 2020-01-27
Payer: COMMERCIAL

## 2020-01-27 PROCEDURE — 97110 THERAPEUTIC EXERCISES: CPT

## 2020-01-27 ASSESSMENT — PAIN DESCRIPTION - LOCATION: LOCATION: BACK

## 2020-01-27 ASSESSMENT — PAIN DESCRIPTION - ORIENTATION: ORIENTATION: MID

## 2020-01-27 ASSESSMENT — PAIN SCALES - GENERAL: PAINLEVEL_OUTOF10: 4

## 2020-01-27 ASSESSMENT — PAIN DESCRIPTION - PAIN TYPE: TYPE: CHRONIC PAIN

## 2020-01-27 NOTE — PROGRESS NOTES
2 wt x10 Cues not to hold breath and gentle ROM   Exercise 11: HydroChest L-5  2x10 pillow at head   Exercise 12: HydroStick forward/back, diagonals to right and diagonals to left  15x each with cervical neutral  (make sure blinds are up because patietn gets dizzy)  Exercise 13: Warm  BIODEX in OT  RPM x 2 min retro/forward changing every 30 seconds. cerv neutral-no increased pain  Exercise 14: peach orange x10 sh rows/ext and bilateral  arm horiz abd, bilateral ER x10   occ tactile and verbal cues for technique  slight increased pain with ER on R UE -cues for   Exercise 15: desk push ups x15  (50%)  range   Exercise 16: Total Gym  prone-L-H x15 B sh flex, scaption, horiz abd, sh ext., rows  0 wt towel roll at head         Activity Tolerance:  Activity Tolerance: Patient Tolerated treatment well  Activity Tolerance: Pain level remained 4/10 throughout session at mid thoracic region. Pain free ROM through painfree with all exs. Assessment: Body structures, Functions, Activity limitations: Decreased functional mobility , Decreased ROM, Decreased posture, Increased pain, Decreased high-level IADLs  Assessment: Pt requires cues not to hold breath. Discussed follow up with orthopedic spine Dr., if continued concerns persist. Cerv neutrla emphasized and HEP. Pain free ROM. Prognosis: Good  REQUIRES PT FOLLOW UP: Yes  Discharge Recommendations: Continue to assess pending progress    Patient Education:  PT Education: Home Exercise Program  Patient Education: Continue HEP, monitor resposne to session. Plan:  Times per week: 2x  Plan weeks: 8 weeks  Specific instructions for Next Treatment: posture awareness, kinesiotaping, myofascial release to traps and rhomboids. Work on gentle mobilizations in sagittal plane onlyl for now. Monitor symptoms to decrease. posterior shoulder stretch, scap ex for positon/posture.    Current Treatment Recommendations: Strengthening, ROM, Manual Therapy - Joint Manipulation, Manual Therapy - Soft Tissue Mobilization, Home Exercise Program, Modalities, Pain Management    Goals:  Patient goals : Patient would like to be able to tolerate sitting/standing better >20 minutes and lift and twist without right mid back pain. GOAL being met/in progress Notes able to rotate trunk further without pain and pain level less compared to when he started PT. He is able to tolerate sitting 20 minutes. Short term goals  Time Frame for Short term goals: 4 weeks 1/20/20   Short term goal 1: Patient to report of decreased pain levels from 7/10 to 2/10 at right interscap region and mid thoracic levels with activities of rotation, and sitting straight. GOAL IN PROGRESS and being MET Noting pain level decreased to 4-5/10 region and more center of spine now instead of to the right T4T5 levels mid spine . Rotation improving and able to tolerate upright posture. Short term goal 2: Patient to demonstrate improved posture/position awareness with upright standing and sitting posture in session with less forward flexed neck and shoulders/ back. GOAL BEING MET Cues remain for posture but it is improving. Continue to slouch at times in sitting positoin  Short term goal 3: Patient to demonstrate increased thoracic ROM with flexion to extension to WNLS, Rotation right and left without pain and WNLS. GOAL NOT MET Rotation not to 45 degrees right and left limited due to end range pain  Short term goal 4: Patient to demonstrate decreased tenderness and tension at right interscap region at rhomboids middle traps from moderate to min tension. GOAL in progress and noting less tenderness interscap rhomboid region. MOre localized right transvers process region instead of at rhomboid region, T4T5      Long term goals  Time Frame for Long term goals : 8 weeks  Long term goal 1: Oswestry from 20% to no greater than 10%. GOAL NOT MET Oswestry 26%.     Long term goal 2: Patient independent in HEP with pain

## 2020-01-30 ENCOUNTER — APPOINTMENT (OUTPATIENT)
Dept: PHYSICAL THERAPY | Age: 35
End: 2020-01-30
Payer: COMMERCIAL

## 2020-02-03 ENCOUNTER — HOSPITAL ENCOUNTER (OUTPATIENT)
Dept: PHYSICAL THERAPY | Age: 35
Setting detail: THERAPIES SERIES
Discharge: HOME | End: 2020-02-03
Payer: COMMERCIAL

## 2020-02-03 PROCEDURE — 97110 THERAPEUTIC EXERCISES: CPT

## 2020-02-03 ASSESSMENT — PAIN SCALES - GENERAL: PAINLEVEL_OUTOF10: 4

## 2020-02-03 ASSESSMENT — PAIN DESCRIPTION - PAIN TYPE: TYPE: CHRONIC PAIN

## 2020-02-03 ASSESSMENT — PAIN DESCRIPTION - ORIENTATION: ORIENTATION: MID

## 2020-02-03 ASSESSMENT — PAIN DESCRIPTION - LOCATION: LOCATION: BACK

## 2020-02-03 NOTE — DISCHARGE SUMMARY
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 Additional Pertinent Hx:  MRSA contact precaution                  General:  PT Visit Information  Onset Date: 19  PT Insurance Information: [employer] of [address] required, He has been approved for 12 visits of PT from 19 to 20. aquatic not covered, modalities yes, iontophoresis not covered. Total # of Visits Approved: 12  Total # of Visits to Date: 6  Plan of Care/Certification Expiration Date: 20  Canceled Appointment: 1  Progress Note Counter: 3/3  for PN                Subjective:  Chart Reviewed: Yes  Patient assessed for rehabilitation services?: Yes  Response To Previous Treatment: Patient with no complaints from previous session. Family / Caregiver Present: No  Comments: Follow up with Dr. Redd Albrecht on 20   Other (Comment): reason for referral thoracic disc displacement. Subjective: Patient reports of continued pinching feeling in back that is intermittent. Feels like PT did help and notes less pain but continues to pinch at times. Pain:  Patient Currently in Pain: Yes  Pain Assessment: 0-10  Pain Level: 4  Pain Type: Chronic pain  Pain Location: Back  Pain Orientation: Mid      Objective    Exercises  Exercise 1: MRSA in his medical record - contact precaution  Exercise 2: Reassessment see goal summary for status. Oswestry remained 26%.   Trunk rotation 42-45 degrees  with improvenet as compared to initial term goal 1: Oswestry from 20% to no greater than 10%. GOAL NOT MET Oswestry 26%. Long term goal 2: Patient independent in HEP with pain management, improved painfree ROM and progression in ex program.  GOAL MET Patient has HEP to follow.       Terra Collins, [address] [facility]

## 2020-02-11 ENCOUNTER — OFFICE VISIT (OUTPATIENT)
Dept: FAMILY MEDICINE CLINIC | Age: 35
End: 2020-02-11

## 2020-02-11 VITALS
DIASTOLIC BLOOD PRESSURE: 88 MMHG | OXYGEN SATURATION: 99 % | TEMPERATURE: 97.5 F | BODY MASS INDEX: 33.18 KG/M2 | HEART RATE: 77 BPM | WEIGHT: 237 LBS | SYSTOLIC BLOOD PRESSURE: 110 MMHG | HEIGHT: 71 IN

## 2020-02-11 PROBLEM — Z72.0 TOBACCO ABUSE: Status: ACTIVE | Noted: 2020-02-11

## 2020-02-11 PROCEDURE — 99213 OFFICE O/P EST LOW 20 MIN: CPT | Performed by: FAMILY MEDICINE

## 2020-02-11 SDOH — ECONOMIC STABILITY: FOOD INSECURITY: WITHIN THE PAST 12 MONTHS, YOU WORRIED THAT YOUR FOOD WOULD RUN OUT BEFORE YOU GOT MONEY TO BUY MORE.: OFTEN TRUE

## 2020-02-11 SDOH — ECONOMIC STABILITY: TRANSPORTATION INSECURITY
IN THE PAST 12 MONTHS, HAS LACK OF TRANSPORTATION KEPT YOU FROM MEETINGS, WORK, OR FROM GETTING THINGS NEEDED FOR DAILY LIVING?: YES

## 2020-02-11 SDOH — ECONOMIC STABILITY: INCOME INSECURITY: HOW HARD IS IT FOR YOU TO PAY FOR THE VERY BASICS LIKE FOOD, HOUSING, MEDICAL CARE, AND HEATING?: VERY HARD

## 2020-02-11 SDOH — ECONOMIC STABILITY: FOOD INSECURITY: WITHIN THE PAST 12 MONTHS, THE FOOD YOU BOUGHT JUST DIDN'T LAST AND YOU DIDN'T HAVE MONEY TO GET MORE.: OFTEN TRUE

## 2020-02-11 SDOH — ECONOMIC STABILITY: TRANSPORTATION INSECURITY
IN THE PAST 12 MONTHS, HAS THE LACK OF TRANSPORTATION KEPT YOU FROM MEDICAL APPOINTMENTS OR FROM GETTING MEDICATIONS?: YES

## 2020-02-11 ASSESSMENT — ENCOUNTER SYMPTOMS
EYE PAIN: 0
BLOOD IN STOOL: 0
ABDOMINAL PAIN: 0
SHORTNESS OF BREATH: 0
VOMITING: 0
COUGH: 0
NAUSEA: 0
CONSTIPATION: 0
DIARRHEA: 0
TROUBLE SWALLOWING: 0

## 2020-02-11 NOTE — PROGRESS NOTES
[address]  [facility] [address]  Dept: [phone]  Dept Fax: [phone]  Loc: [phone]      Pravin Vargas is a 30 y.o. male who presents today for:  Chief Complaint   Patient presents with    Otitis Media     better still painful,     Headache     still ongoing        Goals      Exercise 3x per week (30 min per time)           HPI:     Pravin Vargas is a 30 y.o. male who has a past medical history of Staph infection (2014) who presents today for follow up for bilateral ear infection. Seen on 1/10/2020. At that time was taking amoxicillin for bilateral otitis media and was given coupon for ofloxacin drops due to ruptured right ear drum. Today he is still having some right ear pain. He states that the pain is around a 2/10. Feels the pain at least twice a day, unsure of what brings the pain on and typically lasts for around 5-10 minutes until it resolves on its own. He also states that his hearing has improved some since his last visit. Current smoker. Still smoking around a half a pack a day. Pre-contemplative. States that he is unaware if his insurance has gone through yet. Has been going to PT for work injury. Following with Dr. Bisi Lacy for this issue. Had a beam fall on his head causing significant thoracic pain. HM: Varicella Titers declines today, HIV screen declines today, Tdap declines today, Flu declines today, Pneumonia declines today.          Past Medical History:   Diagnosis Date    Staph infection 2014      Past Surgical History:   Procedure Laterality Date    APPENDECTOMY      DENTAL SURGERY      WISDOM TOOTH EXTRACTION       Family History   Problem Relation Age of Onset    High Blood Pressure Father     Asthma Father      Social History     Tobacco Use    Smoking status: Current Every Day Smoker     Packs/day: 0.50     Years: 13.00     Pack years: 6.50     Types: Cigarettes Start date: 1/10/2007    Smokeless tobacco: Former User     Types: Chew    Tobacco comment: always trying to quit   Substance Use Topics    Alcohol use: No      Current Outpatient Medications   Medication Sig Dispense Refill    ibuprofen (ADVIL;MOTRIN) 200 MG tablet Take 600 mg by mouth every 6 hours as needed for Pain      Meloxicam (MOBIC PO) Take by mouth       No current facility-administered medications for this visit. No Known Allergies    Health Maintenance   Topic Date Due    Varicella vaccine (1 of 2 - 2-dose childhood series) 11/26/1988    Pneumococcal 0-64 years Vaccine (1 of 1 - PPSV23) 11/26/1993    DTaP/Tdap/Td vaccine (1 - Tdap) 11/26/1998    HIV screen  11/26/2002    Flu vaccine (1) 09/01/2019    Shingles Vaccine (1 of 2) 11/26/2037    Hepatitis A vaccine  Aged Out    Hepatitis B vaccine  Aged Out    Hib vaccine  Aged Out    Meningococcal (ACWY) vaccine  Aged Out       Subjective:      Review of Systems   Constitutional: Negative for chills, fatigue and fever. HENT: Positive for ear pain. Negative for ear discharge, hearing loss, postnasal drip and trouble swallowing. Eyes: Negative for pain and visual disturbance. Respiratory: Negative for cough and shortness of breath. Cardiovascular: Negative for chest pain and palpitations. Gastrointestinal: Negative for abdominal pain, blood in stool, constipation, diarrhea, nausea and vomiting. Genitourinary: Negative for dysuria. Skin: Negative for rash. Neurological: Negative for seizures and headaches. Psychiatric/Behavioral: Negative for dysphoric mood. The patient is not nervous/anxious. Objective:     Vitals:    02/11/20 1300 02/11/20 1311   BP: (!) 116/94 110/88   Pulse: 77    Temp: 97.5 °F (36.4 °C)    TempSrc: Oral    SpO2: 99%    Weight: 237 lb (107.5 kg)    Height: 5' 11\" (1.803 m)        Body mass index is 33.05 kg/m².     Wt Readings from Last 3 Encounters:   02/11/20 237 lb (107.5 kg)   01/10/20 243 lb saturation is 99%. AAO/NAD, appropriate affect for mood  Ear: right one has mild scr tissue, no bulging or fluid level. Left one back to normal, no bulging, no fluid level. Nostrils: swollen turbinates, erythematous, clear drainage. Diagnosis Orders   1. Recurrent acute suppurative otitis media of right ear with spontaneous rupture of tympanic membrane     2. Recurrent acute suppurative otitis media without spontaneous rupture of left tympanic membrane     3. Tobacco abuse     4. Chronic thoracic back pain, unspecified back pain laterality         Plan  Strongly advised to quit smoking  Zyrtec during allergy season      Health Maintenance Due   Topic Date Due    Varicella vaccine (1 of 2 - 2-dose childhood series) 11/26/1988    Pneumococcal 0-64 years Vaccine (1 of 1 - PPSV23) 11/26/1993    DTaP/Tdap/Td vaccine (1 - Tdap) 11/26/1998    HIV screen  11/26/2002    Flu vaccine (1) 09/01/2019         Attending Physician Statement  I have discussed the case, including pertinent history and exam findings with the resident. I also have seen the patient and performed key portions of the examination. I agree with the documented assessment and plan as documented by the resident.   GE modifier added to this encounter      Radha Christopher MD 2/11/2020 1:45 PM

## 2020-02-11 NOTE — PATIENT INSTRUCTIONS
Thank you   1. Thank you for trusting us with your healthcare needs. You may receive a survey regarding today's visit. It would help us out if you would take a few moments to provide your feedback. We value your input. 2. Please bring in ALL medications BOTTLES, including inhalers, herbal supplements, over the counter, prescribed & non-prescribed medicine. The office would like actual medication bottles and a list.   3. Please note our OFFICE POLICIES:   a. Prior to getting your labs drawn, please check with your insurance company for benefits and eligibility of lab services. Often, insurance companies cover certain tests for preventative visits only. It is patient's responsibility to see what is covered. b. We are unable to change a diagnosis after the test has been performed. c. Lab orders will not be re-printed. Please hold onto your original lab orders and take them to your lab to be completed. d. If you no show your scheduled appointment three times, you will be dismissed from this practice. e. Jai Franc must be completed 24 hours prior to your schedule appointment. 4. If the list below has been completed, PLEASE FAX RECORDS TO OUR OFFICE @ [phone].  Once the records have been received we will update your records at our office:  Health Maintenance Due   Topic Date Due    Varicella vaccine (1 of 2 - 2-dose childhood series) 11/26/1988    Pneumococcal 0-64 years Vaccine (1 of 1 - PPSV23) 11/26/1993    DTaP/Tdap/Td vaccine (1 - Tdap) 11/26/1998    HIV screen  11/26/2002    Flu vaccine (1) 09/01/2019

## 2021-06-02 ENCOUNTER — HOSPITAL ENCOUNTER (OUTPATIENT)
Dept: PHYSICAL THERAPY | Age: 36
Setting detail: THERAPIES SERIES
Discharge: HOME | End: 2021-06-02
Payer: COMMERCIAL

## 2021-06-02 PROCEDURE — 97162 PT EVAL MOD COMPLEX 30 MIN: CPT

## 2021-06-02 NOTE — PROGRESS NOTES
** PLEASE SIGN, DATE AND TIME CERTIFICATION BELOW AND RETURN TO [facility] OUTPATIENT REHABILITATION (FAX #: [phone]). ATTEST/CO-SIGN IF ACCESSING VIA [employer]. THANK YOU.**    I certify that I have examined the patient below and determined that Physical Medicine and Rehabilitation service is necessary and that I approve the established plan of care for up to 90 days or as specifically noted. Attestation, signature or co-signature of physician indicates approval of certification requirements.    ________________________ ____________ __________  Physician Signature   Date   Time  [address] [facility]  PHYSICAL THERAPY  [x] EVALUATION  [] DAILY NOTE (LAND) [] DAILY NOTE (AQUATIC ) [] PROGRESS NOTE [] DISCHARGE NOTE    [x] 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 Date of Evaluation 21    Additional Pertinent History Previous history of thoracic pain. Followed in PT 2019 for evaluation. Has had injections at thoracic spine. Functional Outcome Measure Used Oswestry   Functional Outcome Score 14% (21)       Insurance: Primary: Payor: Carla Chamberlain ISAURO /  /  / ,   Secondary:    Authorization Information: Yes approved for PT 2-3x per week for 6 weeks . Total of 18 visits from 2021 to 21. Aquatic therapy not covered, telehealth not covered, modalities yes, ionto not covered. Visit # 1, 1/10 for progress note   Visits Allowed: 18   Recertification Date:    Physician Follow-Up: Not sure of follow up appointment. Scheduled with in next 2 days. Not sure of date.     Physician Orders: PT evaluated and treat. Thoracic pain. History of Present Illness: Patient followed in PT previously for thoracic pain 12/2019. Since then, has had 3 injections at thoracic spine. This last one (third) injection did not help. Continues to follow with chiropractor for adjustments, estim. SUBJECTIVE: Patient reports pain location pointing at right interscap region (T6T7 region). Notes had some relief after first 2 back injections. This 3rd injection did not help and feels like nothing is making it better. Notes pain in this reaching overhead, twisting trunk to right and left. Occasionally notes taking a deep breath can feel it in this area. Patient reports trying to increase activity level with lifting child overhead but this aggravated the symptoms at right interscap region. Patient relates of injury occurring in this region since May 22, 2019. He was picking up a bar and rotated back to place it on his left shoulder. Medication: over the counter Motrin or Tylenol. Not taking regularly though. No recent Xrays or MRI. Patient not working at present. Has not worked since 2019. Hobbies: painting (artwork), takes daughter swimming/play with daughter, play video games. Social/Functional History and Current Status:  Medications and Allergies have been reviewed and are listed on Medical History Questionnaire. Adwoa Has lives parents in a multiple floor home with level entry. Task Previous Current   ADLs  Independent Independent   IADL's Independent Independent Limited twisting and reaching overhead   Ambulation Independent Independent   Transfers Independent Independent   Recreation Independent Modified Independent-limited with activity. Artwork, video games.  Playing with daughter, takes her swimming   Community Integration Independent Independent   Driving Active  Active    Work temporary disability  temporary disability     OBJECTIVE:    Pain: 5/10 right interscap region T4- T7    Palpation Note tenderness with palpation from right interscap region along thoracic region. Observation    Posture Note sitting with forward slouched position in chair. Note standing posture with forward neck, shoulder posture, thoracic rounding/kyphosis,        Range of Motion Thoracic ROM: rotation right/left WNLS but endrange pain. Right rotation increased and worse following right rotation. Thoracic extension increased and worse. Moderate restriction in extension and painful. UE ROM: shoulder flexion right 150 degrees, left 140 degrees. pronlelying restricted thoracic articulation in extension, Not able to achieve neutral position, shoulder flexion prone not able to clear from floor more than 2 inches. Strength Shoulder strength: WNLS flexors and abductors, ER/IR. Painful following right ER, shoulder flexion. Shoulder extensors 4/5       Sensation Intact WNLS UE and thoracic region. Bed Mobility independent   Transfers independent   Ambulation independent                     TREATMENT   Precautions:    Pain: 5/10 right interscap region T4-T8 region. X in shaded column indicates activity completed today   Modalities Parameters/  Location  Notes                     Manual Therapy Time/Technique  Notes                     Exercise/Intervention   Notes                                                                                  Specific Interventions Next Treatment: posture position awareness, US, KT tape, therap ex: pec stretch, posture awareness, thoracic articulation extension, rotation painfree ranges. Shoulder ROM, posterior shoulder girdle strengthening. Body mechanics. Activity/Treatment Tolerance:  [x]  Patient tolerated treatment well  []  Patient limited by fatigue  []  Patient limited by pain   []  Patient limited by medical complications  []  Other:     Assessment: Patient referred to PT for thoracic pain.  He was followed previously for thoracic pain 2599-7202 in PT. He also continues to follow with chiropractor for adjustments 3x per week. At this time, noting poor posture awareness, decreased thoracic extension, decreased bilateral shoulder flexion ROM, increased thoracic pain right interscap region T4-T7-8 levels with right rotation and extension thoracic spine and reaching overhead. Will follow 2-3x per week to address deficits. Body Structures/Functions/Activity Limitations: impaired activity tolerance, impaired ROM, impaired strength and pain  Prognosis: good    GOALS:  Patient Goal: To have less right thoracic pain with reaching overhead, lifting overhead and rotation of spine. Short Term Goals:  Time Frame: 4 weeks  1. Patient to report of pain no greater than 5/10 right interscap/thoracic region with reaching overhead, rotating to right. 2. Patient to demonstrate increased position/posture awareness in sitting, standing. Improved knowledge of position awareness and body mechanics. 3. Patient to demonstrate increased strength at thoracic extensors, posterior shoulder girdle from 4-/5 to 4/5.   4. Patient to demonstrate increased right/left shoulder ROM in flexion from 140-150 degrees to 160 degrees with reaching overhead without pain at thoracic region. Long Term Goals:  Time Frame: 8 weeks  1. Oswestry from 14% to 5%. 2. Patient to demonstrate independence in HEP with follow through with exercises for ROM, flexibility, pain management, strengthening. Patient Education:   []  HEP/Education Completed: Plan of Care, Goals,    Medbridge Access Code:  []  No new Education completed  []  Reviewed Prior HEP      []  Patient verbalized and/or demonstrated understanding of education provided. []  Patient unable to verbalize and/or demonstrate understanding of education provided. Will continue education.   [x]  Barriers to learning: none    PLAN:  Treatment Recommendations: Strengthening, Range of Motion, Functional Mobility Training, Neuromuscular Re-education, Manual Therapy - Soft Tissue Mobilization, Pain Management, Home Exercise Program, Patient Education and Modalities    [x]  Plan of care initiated. Plan to see patient 2-3 times per week for 8 weeks to address the treatment planned outlined above.   []  Continue with current plan of care  []  Modify plan of care as follows:    []  Hold pending physician visit  []  Discharge    Time In 1020   Time Out 1110   Timed Code Minutes: 0 min   Total Treatment Time: 50 min       Electronically Signed by: Naga Smart PT

## 2021-06-09 ENCOUNTER — HOSPITAL ENCOUNTER (OUTPATIENT)
Dept: PHYSICAL THERAPY | Age: 36
Setting detail: THERAPIES SERIES
Discharge: HOME | End: 2021-06-09
Payer: COMMERCIAL

## 2021-06-09 PROCEDURE — 97110 THERAPEUTIC EXERCISES: CPT

## 2021-06-09 PROCEDURE — 97035 APP MDLTY 1+ULTRASOUND EA 15: CPT

## 2021-06-09 NOTE — PROGRESS NOTES
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 Date of Evaluation 21    Additional Pertinent History Previous history of thoracic pain. Followed in PT 2019 for evaluation. Has had injections at thoracic spine. Functional Outcome Measure Used Oswestry   Functional Outcome Score 14% (21)       Insurance: Primary: Payor: Aure FLORIAN /  /  / ,   Secondary:    Authorization Information: Yes approved for PT 2-3x per week for 6 weeks . Total of 18 visits from 2021 to 21. Aquatic therapy not covered, telehealth not covered, modalities yes, ionto not covered. Visit # 2, 2/10 for progress note   Visits Allowed: 18   Recertification Date:    Physician Follow-Up: Not sure of follow up appointment. Scheduled with in next 2 days. Not sure of date. Physician Orders: PT evaluated and treat. Thoracic pain. History of Present Illness: Patient followed in PT previously for thoracic pain 2019. Since then, has had 3 injections at thoracic spine. This last one (third) injection did not help. Continues to follow with chiropractor for adjustments, estim. SUBJECTIVE: Patient states he has a slipped disc at either T5-T6 or T6-T7. Patient reports he is seeing a chiropractor twice a week and will usually feel better for the rest of the day. TREATMENT   Precautions:    Pain: 4/10 Right interscap region T4-T8 region.      X in shaded column indicates activity completed today   Modalities Parameters/  Location  Notes   Ultrasound to Right interscap region 8 minutes, 50% pulsed, 1 w/cm 2, 1 MHz X Patient seated in chair with cues for posture. Manual Therapy Time/Technique  Notes   Rock tape: One (I) strip placed along Right thoracic paraspinals. Also placed an (X) across upper back with 50-75% tension to encourage posture. X Applied to decrease pain and improve posture awareness. Patient holding good postural position and scapular retraction while placing tape. Exercise/Intervention   Notes   Seated posture: shoulder elevation, scapular retraction, posterior shoulder rolls 10x  X Seated with towel roll low back   Thoracic extension with ball at thoracic spine 10x 3 seconds X    Thoracic extension with ball at thoracic spine with horizontal abduction 10x  X    Doorway pec stretch 3x  20 seconds X                                                       Specific Interventions Next Treatment: posture position awareness, US, KT tape, therap ex: pec stretch, posture awareness, thoracic articulation extension, rotation painfree ranges. Shoulder ROM, posterior shoulder girdle strengthening. Body mechanics. Activity/Treatment Tolerance:   [x]  Patient tolerated treatment well  []  Patient limited by fatigue  []  Patient limited by pain   []  Patient limited by medical complications  []  Other:     Assessment:  Patient sitting with very poor posture in lobby and back in clinic. Patient tends to lean forward on his knees with very rounded back and shoulders. Frequent cues needed during session to correct posture. Patient was able to correct but needed reminders to maintain. Educated on use of towel roll at lower back to support seated posture. Patient had good relief with thoracic extension over the ball and plans to get ball for HEP. Introduced patient to ultrasound treatment and application of rocktape.  Instructed patient in care of tape and to remove immediately if skin irritation occurs. Strong encouragement to work on posture throughout the day. GOALS:  Patient Goal: To have less right thoracic pain with reaching overhead, lifting overhead and rotation of spine. Short Term Goals:  Time Frame: 4 weeks  1. Patient to report of pain no greater than 5/10 right interscap/thoracic region with reaching overhead, rotating to right. 2. Patient to demonstrate increased position/posture awareness in sitting, standing. Improved knowledge of position awareness and body mechanics. 3. Patient to demonstrate increased strength at thoracic extensors, posterior shoulder girdle from 4-/5 to 4/5.   4. Patient to demonstrate increased right/left shoulder ROM in flexion from 140-150 degrees to 160 degrees with reaching overhead without pain at thoracic region. Long Term Goals:  Time Frame: 8 weeks  1. Oswestry from 14% to 5%. 2. Patient to demonstrate independence in HEP with follow through with exercises for ROM, flexibility, pain management, strengthening. Patient Education:   [x]  HEP/Education Completed: Ultrasound, Rocktape care and monitor skin irritation, posture awareness, thoracic extension over ball.  [employer] Access Code:  []  No new Education completed  []  Reviewed Prior HEP      []  Patient verbalized and/or demonstrated understanding of education provided. []  Patient unable to verbalize and/or demonstrate understanding of education provided. Will continue education. [x]  Barriers to learning: none    PLAN:  Treatment Recommendations: Strengthening, Range of Motion, Functional Mobility Training, Neuromuscular Re-education, Manual Therapy - Soft Tissue Mobilization, Pain Management, Home Exercise Program, Patient Education and Modalities    []  Plan of care initiated. Plan to see patient 2-3 times per week for 8 weeks to address the treatment planned outlined above.   [x]  Continue with current plan of care  [] Modify plan of care as follows:    []  Hold pending physician visit  []  Discharge    Time In 0930   Time Out 1015   Timed Code Minutes: 45 min   Total Treatment Time: 45 min       Electronically Signed by: Gamaliel Butcher PTA

## 2021-06-11 ENCOUNTER — APPOINTMENT (OUTPATIENT)
Dept: PHYSICAL THERAPY | Age: 36
End: 2021-06-11
Payer: COMMERCIAL

## 2021-06-11 NOTE — DISCHARGE SUMMARY
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 See last note for details related to results of therapy and goal achievement. Reason for discharge: Patient called and canceled remaining appointments today. Canceled due to doctor telling him. He continues to go to chiropractor 3x per week. .     Patient only attended evaluaton and on treatment session.      Will require new orders if [address], [address]

## 2021-06-14 ENCOUNTER — APPOINTMENT (OUTPATIENT)
Dept: PHYSICAL THERAPY | Age: 36
End: 2021-06-14
Payer: COMMERCIAL

## 2021-06-16 ENCOUNTER — APPOINTMENT (OUTPATIENT)
Dept: PHYSICAL THERAPY | Age: 36
End: 2021-06-16
Payer: COMMERCIAL

## 2021-06-21 ENCOUNTER — APPOINTMENT (OUTPATIENT)
Dept: PHYSICAL THERAPY | Age: 36
End: 2021-06-21
Payer: COMMERCIAL

## 2021-06-23 ENCOUNTER — HOSPITAL ENCOUNTER (OUTPATIENT)
Dept: PHYSICAL THERAPY | Age: 36
Setting detail: THERAPIES SERIES
Discharge: HOME | End: 2021-06-23
Payer: COMMERCIAL

## 2021-07-05 ENCOUNTER — HOSPITAL ENCOUNTER (INPATIENT)
Age: 36
LOS: 1 days | Discharge: HOME | DRG: 446 | End: 2021-07-06
Attending: EMERGENCY MEDICINE | Admitting: FAMILY MEDICINE
Payer: MEDICAID

## 2021-07-05 ENCOUNTER — APPOINTMENT (OUTPATIENT)
Dept: CT IMAGING | Age: 36
DRG: 446 | End: 2021-07-05
Payer: MEDICAID

## 2021-07-05 DIAGNOSIS — N20.1 URETEROLITHIASIS: Primary | ICD-10-CM

## 2021-07-05 PROBLEM — N13.30 HYDRONEPHROSIS, LEFT: Status: ACTIVE | Noted: 2021-07-05

## 2021-07-05 LAB
ALBUMIN SERPL-MCNC: 4.9 G/DL (ref 3.5–5.1)
ALP BLD-CCNC: 75 U/L (ref 38–126)
ALT SERPL-CCNC: 13 U/L (ref 11–66)
AMPHETAMINE+METHAMPHETAMINE URINE SCREEN: NEGATIVE
ANION GAP SERPL CALCULATED.3IONS-SCNC: 19 MEQ/L (ref 8–16)
AST SERPL-CCNC: 14 U/L (ref 5–40)
BACTERIA: ABNORMAL /HPF
BARBITURATE QUANTITATIVE URINE: NEGATIVE
BENZODIAZEPINE QUANTITATIVE URINE: NEGATIVE
BILIRUB SERPL-MCNC: 0.8 MG/DL (ref 0.3–1.2)
BILIRUBIN URINE: ABNORMAL
BLOOD, URINE: ABNORMAL
BUN BLDV-MCNC: 10 MG/DL (ref 7–22)
CALCIUM SERPL-MCNC: 10.3 MG/DL (ref 8.5–10.5)
CANNABINOID QUANTITATIVE URINE: POSITIVE
CASTS UA: ABNORMAL /LPF
CHARACTER, URINE: ABNORMAL
CHLORIDE BLD-SCNC: 101 MEQ/L (ref 98–111)
CO2: 19 MEQ/L (ref 23–33)
COCAINE METABOLITE QUANTITATIVE URINE: NEGATIVE
COLOR: ABNORMAL
CREAT SERPL-MCNC: 0.9 MG/DL (ref 0.4–1.2)
CRYSTALS, UA: ABNORMAL
EPITHELIAL CELLS, UA: ABNORMAL /HPF
GFR SERPL CREATININE-BSD FRML MDRD: > 90 ML/MIN/1.73M2
GLUCOSE BLD-MCNC: 152 MG/DL (ref 70–108)
GLUCOSE URINE: NEGATIVE MG/DL
ICTOTEST: NEGATIVE
KETONES, URINE: >= 80
LEUKOCYTE ESTERASE, URINE: NEGATIVE
LIPASE: 26.8 U/L (ref 5.6–51.3)
MUCUS: ABNORMAL
NITRITE, URINE: NEGATIVE
OPIATES, URINE: POSITIVE
OSMOLALITY CALCULATION: 279.6 MOSMOL/KG (ref 275–300)
OXYCODONE: NEGATIVE
PH UA: 6 (ref 5–9)
PHENCYCLIDINE QUANTITATIVE URINE: NEGATIVE
POTASSIUM REFLEX MAGNESIUM: 3.7 MEQ/L (ref 3.5–5.2)
PROTEIN UA: 30
RBC URINE: > 200 /HPF
SCAN OF BLOOD SMEAR: NORMAL
SODIUM BLD-SCNC: 139 MEQ/L (ref 135–145)
SPECIFIC GRAVITY, URINE: >= 1.03 (ref 1–1.03)
TOTAL PROTEIN: 7.7 G/DL (ref 6.1–8)
UROBILINOGEN, URINE: 0.2 EU/DL (ref 0–1)
WBC UA: ABNORMAL /HPF

## 2021-07-05 PROCEDURE — 96374 THER/PROPH/DIAG INJ IV PUSH: CPT

## 2021-07-05 PROCEDURE — 6360000002 HC RX W HCPCS: Performed by: EMERGENCY MEDICINE

## 2021-07-05 PROCEDURE — 74176 CT ABD & PELVIS W/O CONTRAST: CPT

## 2021-07-05 PROCEDURE — 80307 DRUG TEST PRSMV CHEM ANLYZR: CPT

## 2021-07-05 PROCEDURE — 83690 ASSAY OF LIPASE: CPT

## 2021-07-05 PROCEDURE — 2580000003 HC RX 258: Performed by: FAMILY MEDICINE

## 2021-07-05 PROCEDURE — 6360000002 HC RX W HCPCS: Performed by: FAMILY MEDICINE

## 2021-07-05 PROCEDURE — 36415 COLL VENOUS BLD VENIPUNCTURE: CPT

## 2021-07-05 PROCEDURE — C9113 INJ PANTOPRAZOLE SODIUM, VIA: HCPCS | Performed by: FAMILY MEDICINE

## 2021-07-05 PROCEDURE — 1200000000 HC SEMI PRIVATE

## 2021-07-05 PROCEDURE — 99223 1ST HOSP IP/OBS HIGH 75: CPT | Performed by: FAMILY MEDICINE

## 2021-07-05 PROCEDURE — 96361 HYDRATE IV INFUSION ADD-ON: CPT

## 2021-07-05 PROCEDURE — 85025 COMPLETE CBC W/AUTO DIFF WBC: CPT

## 2021-07-05 PROCEDURE — 80053 COMPREHEN METABOLIC PANEL: CPT

## 2021-07-05 PROCEDURE — 2580000003 HC RX 258: Performed by: EMERGENCY MEDICINE

## 2021-07-05 PROCEDURE — 96376 TX/PRO/DX INJ SAME DRUG ADON: CPT

## 2021-07-05 PROCEDURE — 81001 URINALYSIS AUTO W/SCOPE: CPT

## 2021-07-05 PROCEDURE — 99283 EMERGENCY DEPT VISIT LOW MDM: CPT

## 2021-07-05 PROCEDURE — 96375 TX/PRO/DX INJ NEW DRUG ADDON: CPT

## 2021-07-05 RX ORDER — ACETAMINOPHEN 650 MG/1
650 SUPPOSITORY RECTAL EVERY 6 HOURS PRN
Status: DISCONTINUED | OUTPATIENT
Start: 2021-07-05 | End: 2021-07-06 | Stop reason: HOSPADM

## 2021-07-05 RX ORDER — 0.9 % SODIUM CHLORIDE 0.9 %
1000 INTRAVENOUS SOLUTION INTRAVENOUS ONCE
Status: COMPLETED | OUTPATIENT
Start: 2021-07-05 | End: 2021-07-05

## 2021-07-05 RX ORDER — ONDANSETRON 4 MG/1
4 TABLET, ORALLY DISINTEGRATING ORAL EVERY 8 HOURS PRN
Status: DISCONTINUED | OUTPATIENT
Start: 2021-07-05 | End: 2021-07-06 | Stop reason: HOSPADM

## 2021-07-05 RX ORDER — ONDANSETRON 2 MG/ML
4 INJECTION INTRAMUSCULAR; INTRAVENOUS EVERY 6 HOURS PRN
Status: DISCONTINUED | OUTPATIENT
Start: 2021-07-05 | End: 2021-07-06 | Stop reason: HOSPADM

## 2021-07-05 RX ORDER — SODIUM CHLORIDE 0.9 % (FLUSH) 0.9 %
10 SYRINGE (ML) INJECTION PRN
Status: DISCONTINUED | OUTPATIENT
Start: 2021-07-05 | End: 2021-07-06 | Stop reason: HOSPADM

## 2021-07-05 RX ORDER — MORPHINE SULFATE 4 MG/ML
4 INJECTION, SOLUTION INTRAMUSCULAR; INTRAVENOUS ONCE
Status: COMPLETED | OUTPATIENT
Start: 2021-07-05 | End: 2021-07-05

## 2021-07-05 RX ORDER — SODIUM CHLORIDE 0.9 % (FLUSH) 0.9 %
10 SYRINGE (ML) INJECTION EVERY 12 HOURS SCHEDULED
Status: DISCONTINUED | OUTPATIENT
Start: 2021-07-05 | End: 2021-07-06 | Stop reason: HOSPADM

## 2021-07-05 RX ORDER — TAMSULOSIN HYDROCHLORIDE 0.4 MG/1
0.4 CAPSULE ORAL DAILY
Status: DISCONTINUED | OUTPATIENT
Start: 2021-07-06 | End: 2021-07-06 | Stop reason: HOSPADM

## 2021-07-05 RX ORDER — IBUPROFEN 200 MG
400 TABLET ORAL ONCE
Status: COMPLETED | OUTPATIENT
Start: 2021-07-05 | End: 2021-07-06

## 2021-07-05 RX ORDER — PANTOPRAZOLE SODIUM 40 MG/10ML
40 INJECTION, POWDER, LYOPHILIZED, FOR SOLUTION INTRAVENOUS DAILY
Status: DISCONTINUED | OUTPATIENT
Start: 2021-07-05 | End: 2021-07-06 | Stop reason: HOSPADM

## 2021-07-05 RX ORDER — POLYETHYLENE GLYCOL 3350 17 G/17G
17 POWDER, FOR SOLUTION ORAL DAILY PRN
Status: DISCONTINUED | OUTPATIENT
Start: 2021-07-05 | End: 2021-07-06 | Stop reason: HOSPADM

## 2021-07-05 RX ORDER — SODIUM CHLORIDE 9 MG/ML
25 INJECTION, SOLUTION INTRAVENOUS PRN
Status: DISCONTINUED | OUTPATIENT
Start: 2021-07-05 | End: 2021-07-06 | Stop reason: HOSPADM

## 2021-07-05 RX ORDER — SODIUM CHLORIDE 9 MG/ML
INJECTION, SOLUTION INTRAVENOUS CONTINUOUS
Status: DISCONTINUED | OUTPATIENT
Start: 2021-07-06 | End: 2021-07-06 | Stop reason: HOSPADM

## 2021-07-05 RX ORDER — ACETAMINOPHEN 325 MG/1
650 TABLET ORAL EVERY 6 HOURS PRN
Status: DISCONTINUED | OUTPATIENT
Start: 2021-07-05 | End: 2021-07-06 | Stop reason: HOSPADM

## 2021-07-05 RX ORDER — KETOROLAC TROMETHAMINE 30 MG/ML
30 INJECTION, SOLUTION INTRAMUSCULAR; INTRAVENOUS EVERY 6 HOURS PRN
Status: DISCONTINUED | OUTPATIENT
Start: 2021-07-05 | End: 2021-07-06 | Stop reason: HOSPADM

## 2021-07-05 RX ORDER — KETOROLAC TROMETHAMINE 30 MG/ML
30 INJECTION, SOLUTION INTRAMUSCULAR; INTRAVENOUS ONCE
Status: COMPLETED | OUTPATIENT
Start: 2021-07-05 | End: 2021-07-05

## 2021-07-05 RX ORDER — ONDANSETRON 2 MG/ML
4 INJECTION INTRAMUSCULAR; INTRAVENOUS ONCE
Status: COMPLETED | OUTPATIENT
Start: 2021-07-05 | End: 2021-07-05

## 2021-07-05 RX ORDER — LORAZEPAM 2 MG/ML
0.5 INJECTION INTRAMUSCULAR ONCE
Status: COMPLETED | OUTPATIENT
Start: 2021-07-05 | End: 2021-07-05

## 2021-07-05 RX ADMIN — MORPHINE SULFATE 4 MG: 4 INJECTION, SOLUTION INTRAMUSCULAR; INTRAVENOUS at 14:39

## 2021-07-05 RX ADMIN — ONDANSETRON 4 MG: 2 INJECTION INTRAMUSCULAR; INTRAVENOUS at 13:16

## 2021-07-05 RX ADMIN — PANTOPRAZOLE SODIUM 40 MG: 40 INJECTION, POWDER, FOR SOLUTION INTRAVENOUS at 23:15

## 2021-07-05 RX ADMIN — KETOROLAC TROMETHAMINE 30 MG: 30 INJECTION, SOLUTION INTRAMUSCULAR; INTRAVENOUS at 19:00

## 2021-07-05 RX ADMIN — CEFTRIAXONE SODIUM 1000 MG: 1 INJECTION, POWDER, FOR SOLUTION INTRAMUSCULAR; INTRAVENOUS at 23:15

## 2021-07-05 RX ADMIN — SODIUM CHLORIDE: 9 INJECTION, SOLUTION INTRAVENOUS at 23:21

## 2021-07-05 RX ADMIN — MORPHINE SULFATE 4 MG: 4 INJECTION, SOLUTION INTRAMUSCULAR; INTRAVENOUS at 15:57

## 2021-07-05 RX ADMIN — LORAZEPAM 0.5 MG: 2 INJECTION INTRAMUSCULAR; INTRAVENOUS at 13:28

## 2021-07-05 RX ADMIN — SODIUM CHLORIDE 1000 ML: 9 INJECTION, SOLUTION INTRAVENOUS at 13:16

## 2021-07-05 ASSESSMENT — PAIN SCALES - GENERAL
PAINLEVEL_OUTOF10: 10
PAINLEVEL_OUTOF10: 3
PAINLEVEL_OUTOF10: 9
PAINLEVEL_OUTOF10: 1

## 2021-07-05 ASSESSMENT — PAIN DESCRIPTION - PAIN TYPE: TYPE: ACUTE PAIN

## 2021-07-05 ASSESSMENT — PAIN DESCRIPTION - ORIENTATION: ORIENTATION: LEFT

## 2021-07-05 ASSESSMENT — PAIN DESCRIPTION - PROGRESSION: CLINICAL_PROGRESSION: GRADUALLY IMPROVING

## 2021-07-05 ASSESSMENT — PAIN - FUNCTIONAL ASSESSMENT: PAIN_FUNCTIONAL_ASSESSMENT: ACTIVITIES ARE NOT PREVENTED

## 2021-07-05 ASSESSMENT — PAIN DESCRIPTION - LOCATION: LOCATION: ABDOMEN;GROIN

## 2021-07-05 ASSESSMENT — PAIN DESCRIPTION - ONSET: ONSET: ON-GOING

## 2021-07-05 ASSESSMENT — PAIN DESCRIPTION - FREQUENCY: FREQUENCY: CONTINUOUS

## 2021-07-05 ASSESSMENT — PAIN DESCRIPTION - DESCRIPTORS: DESCRIPTORS: SHOOTING;SHARP;CRAMPING

## 2021-07-05 NOTE — ED PROVIDER NOTES
[address] [place] [address]       Chief Complaint   Patient presents with    Anxiety    Abdominal Pain    Hyperventilating    Testicle Pain       Nurses Notes reviewed and I agree except as noted in the HPI. HISTORY OF PRESENT ILLNESS    Tony Carreon is a 37 y.o. male who presents with complaint of anxiety, lower abdominal pain. Patient also complaining of testicular pain bilaterally. He denies diarrhea, reports history of constipation. Has nausea without vomiting. No fever chills. Onset: Acute  Duration: Hours prior to arrival  Timing: Constant  Location of Pain: Lower abdominal pain/testicular pain  Intesity/severity: Moderate pain  Modifying Factors: Nothing in particular  Relieved by;  Previous Episodes; Tx Before arrival: None  REVIEW OF SYSTEMS      Review of Systems   Constitutional: Negative for fever, chills, diaphoresis and fatigue. HENT: Negative for congestion, drooling, facial swelling and sore throat. Eyes: Negative for photophobia, pain and discharge. Respiratory: Negative for cough, shortness of breath, wheezing and stridor. Cardiovascular: Negative for chest pain, palpitations and leg swelling. Gastrointestinal: Positive for lower abdominal pain; negative for blood in stool and abdominal distention. Genitourinary: Negative for dysuria, urgency, hematuria and difficulty urinating. Musculoskeletal: Negative for gait problem, neck pain and neck stiffness. Skin; No rash, No itching  Neurological: Negative for seizures, weakness and numbness. Psychiatric/Behavioral: Negative for hallucinations, confusion and agitation. PAST MEDICAL HISTORY    has a past medical history of Staph infection. SURGICAL HISTORY      has a past surgical history that includes Appendectomy; Dental surgery; and Hughson tooth extraction.     CURRENT MEDICATIONS       Current Discharge Medication List      CONTINUE these medications which have NOT CHANGED    Details   Meloxicam (MOBIC PO) Take by mouth      ibuprofen (ADVIL;MOTRIN) 200 MG tablet Take 600 mg by mouth every 6 hours as needed for Pain             ALLERGIES     has No Known Allergies. FAMILY HISTORY     He indicated that his mother is alive. He indicated that his father is alive. He indicated that his sister is alive. He indicated that his brother is alive. family history includes Asthma in his father; High Blood Pressure in his father. SOCIAL HISTORY      reports that he has been smoking cigarettes. He started smoking about 14 years ago. He has a 6.50 pack-year smoking history. He has quit using smokeless tobacco.  His smokeless tobacco use included chew. He reports that he does not drink alcohol and does not use drugs. PHYSICAL EXAM     INITIAL VITALS:  temporal temperature is 98 °F (36.7 °C). His blood pressure is 130/99 (abnormal) and his pulse is 59. His respiration is 18 and oxygen saturation is 96%. Physical Exam   Constitutional:  well-developed and well-nourished. HENT: Head: Normocephalic, atraumatic, Bilateral external ears normal, Oropharynx mosit, No oral exudates, Nose normal.   Eyes: PERRL, EOMI, Conjunctiva normal, No discharge. No scleral icterus  Neck: Normal range of motion, No tenderness, Supple  Cardiovascular: Normal rate, regular rhythm, S1 normal and S2 normal.  Exam reveals no gallop. Pulmonary/Chest: Effort normal and breath sounds normal. No accessory muscle usage or stridor. No respiratory distress. no wheezes. has no rales. exhibits no tenderness. Abdominal: Soft. Bowel sounds are normal.  exhibits no distension. There is no tenderness. There is no rebound and no guarding. Extremities: No edema, no tenderness, no cyanosis, no clubbing. Musculoskeletal: Good range of motion in major joints is observed. No major deformities noted. Neurological: Alert and oriented ×3, normal motor function, normal sensory function, no focal deficits.   GCS 15  Skin: Skin is warm, dry and 64 66 59    Resp: 16 18 18    Temp:       TempSrc:       SpO2: 97% 98% 96%      Presenting with complaint of left flank pain, positive for kidney stone, pain cannot be controlled in the ED, case discussed with the urology PA, patient admitted to the hospitalist urology consult. Patient is nontoxic. CRITICAL CARE:       CONSULTS:  None    PROCEDURES:  None    FINAL IMPRESSION      1. Ureterolithiasis          DISPOSITION/PLAN   Admitted    PATIENT REFERRED TO:  No follow-up provider specified.     DISCHARGE MEDICATIONS:  Current Discharge Medication List          (Please note that portions of this note were completed with a voice recognition program.  Efforts were made to edit the dictations but occasionally words are mis-transcribed.)    DO Oscar Gonzalez DO  07/06/21 9467

## 2021-07-05 NOTE — ED NOTES
Patient updated on results of CT scan. Patient states \"ok how do I get it out? \" explained to patient that the stone is 3.2mm and should pass on its own. Patient becomes up agitated and states he cant do that. Patient getting his own water wanting to push fluids through. Patient states he cannot pee. Patient states the morphine is wearing off and wants something for pain.       Mauro Figueroa RN  07/05/21 7710

## 2021-07-05 NOTE — ED TRIAGE NOTES
Patient presents with abdominal pain, anxiety, testicle pain, and nausea. Patient is currently writhing around on the bed, hyperventilating, and sweating. Patient noted to have bottom jaw movements from side to side and is often biting his hands during assessment. Patient states \"this is me, I have tourettes and its a tick I do\" Denies street drug us.  IV started, fluids started, zofran given per verbal order from Dr. Zayra Eaton

## 2021-07-05 NOTE — H&P
Hospitalist - History & Physical      Patient: Gurdeep Hoskins    Unit/Bed:[facility]  YOB: 1985  MRN: [id number]   Acct: [de-identified]   PCP: Maritza Vasquez DO    Date of Service: Pt seen/examined on 07/05/21  and Admitted to Inpatient with expected LOS greater than two midnights due to medical therapy. Chief Complaint:  Left flank pain    Assessment and Plan:-  1. Left hydronephrosis secondary to 3.2 mm stone at the left ureterovesical junction: Keep n.p.o. after midnight, IV hydration, pain control with anti-inflammatories Toradol as needed every 6 hours IM, antiemetics, consult urology for procedure tomorrow. 2. Leukocytosis likely reactional or possible UTI: Patient mentioned he does have hematuria currently which could be related to his stone, although this hematuria could be a focus of infection, I will send urine for culture, I will start Rocephin 1 g daily, consult urology as mentioned above for further evaluation tomorrow. 3. Nausea/vomiting: Zofran as needed. 4. Urine drug positive for marijuana and opioids:, Not a good combo, counseled about cessation. 5. DVT prophylaxis with GI prophylaxis. History Of Present Illness:    61-year-old male complaining from left flank pain started at 11 AM today. He was in severe pain to the point he was about to call 911. He came to the ER and received Zofran in addition to Ativan which improved his pain but not totally. He reported he was not urinating well in the past 3 days. He also noticed hematuria today almost 2 hours ago in the ED. This is the first time happened to him. No history of kidney stones in the past.  He is totally healthy not usually or chronically using any medications. He also received morphine which did not help with his pain although Toradol ordered by the ER eventually which helped with his pain and currently stable.   The patient denies any fever, chills, hematochezia, loss of taste, loss of smell, non-distended with normal bowel sounds. Musculoskeletal:  No clubbing, cyanosis or edema bilaterally. Skin: Skin color, texture, turgor normal.  No rashes or lesions. Neurologic:  Neurovascularly intact without any focal sensory/motor deficits. Cranial nerves: II-XII intact, grossly non-focal.  Psychiatric: Alert and oriented, thought content appropriate, normal insight  Capillary Refill: Brisk,< 3 seconds   Peripheral Pulses: +2 palpable, equal bilaterally     Labs:   Recent Labs     07/05/21  1310   WBC 13.5*   HGB 17.3   HCT 50.3        Recent Labs     07/05/21  1310      K 3.7      CO2 19*   BUN 10   CREATININE 0.9   CALCIUM 10.3     Recent Labs     07/05/21  1310   AST 14   ALT 13   BILITOT 0.8   ALKPHOS 75     No results for input(s): INR in the last 72 hours. No results for input(s): Tej Sarmientoens in the last 72 hours. Urinalysis:    Lab Results   Component Value Date    NITRU NEGATIVE 07/05/2021    WBCUA 2-4 07/05/2021    BACTERIA NONE 07/05/2021    RBCUA > 200 07/05/2021    BLOODU LARGE 07/05/2021    GLUCOSEU NEGATIVE 07/05/2021       Radiology:   CT ABDOMEN PELVIS WO CONTRAST Additional Contrast? None   Final Result      1. Left-sided hydronephrosis and hydroureter secondary to a 3.2 mm stone at the left ureterovesical junction. 2. Mild splenomegaly, unchanged since remote study dated 20 May 2009. 3. Small bilateral inguinal lymph nodes present. 4. Right inguinal hernia containing fat. 5. Otherwise negative CT scan of the abdomen and pelvis. **This report has been created using voice recognition software. It may contain minor errors which are inherent in voice recognition technology. **      Final report electronically signed by DR Bucky Roman on 7/5/2021 2:45 PM        CT ABDOMEN PELVIS WO CONTRAST Additional Contrast? None    Result Date: 7/5/2021  PROCEDURE: CT ABDOMEN PELVIS WO CONTRAST CLINICAL INFORMATION: lower abd pain .  COMPARISON: CT scan of the abdomen and pelvis dated 20 May 2009. . TECHNIQUE: Axial 5 mm CT images were obtained through the abdomen and pelvis. No contrast was given. Coronal and sagittal reconstructions were obtained. All CT scans at this facility use dose modulation, iterative reconstruction, and/or weight-based dosing when appropriate to reduce radiation dose to as low as reasonably achievable. FINDINGS: The visualized aspects of the lung bases are clear. The base of the heart is within appropriate limits. The liver is grossly normal. There is mild splenomegaly, unchanged since previous study dated 20 May 2009. . The adrenal glands and pancreas are grossly normal. The gallbladder is normal.  There is left-sided hydronephrosis and hydroureter secondary to a 2.2 mm stone at the left ureterovesical junction. The right kidney is unremarkable. No abnormalities of the small bowel loops are noted. The IVC and aorta are of normal caliber. There are  small bilateral inguinal lymph nodes present. There is right inguinal hernia containing fat. .  The urinary bladder is normal. The prostate gland measures 3.6 x 3.5 cm in size. There is no pelvic free fluid. The colon is grossly normal. . No suspicious osseous lesions are present. 1. Left-sided hydronephrosis and hydroureter secondary to a 3.2 mm stone at the left ureterovesical junction. 2. Mild splenomegaly, unchanged since remote study dated 20 May 2009. 3. Small bilateral inguinal lymph nodes present. 4. Right inguinal hernia containing fat. 5. Otherwise negative CT scan of the abdomen and pelvis. **This report has been created using voice recognition software. It may contain minor errors which are inherent in voice recognition technology. ** Final report electronically signed by DR Sirisha Hill on 7/5/2021 2:45 PM        Electronically signed by Leland Bunch MD on 7/5/2021 at 7:49 PM

## 2021-07-06 ENCOUNTER — ANESTHESIA (OUTPATIENT)
Dept: OPERATING ROOM | Age: 36
DRG: 446 | End: 2021-07-06
Payer: MEDICAID

## 2021-07-06 ENCOUNTER — ANESTHESIA EVENT (OUTPATIENT)
Dept: OPERATING ROOM | Age: 36
DRG: 446 | End: 2021-07-06
Payer: MEDICAID

## 2021-07-06 VITALS
SYSTOLIC BLOOD PRESSURE: 114 MMHG | TEMPERATURE: 97.6 F | OXYGEN SATURATION: 98 % | HEART RATE: 61 BPM | DIASTOLIC BLOOD PRESSURE: 66 MMHG | RESPIRATION RATE: 18 BRPM

## 2021-07-06 VITALS — TEMPERATURE: 97 F | DIASTOLIC BLOOD PRESSURE: 65 MMHG | SYSTOLIC BLOOD PRESSURE: 101 MMHG | OXYGEN SATURATION: 99 %

## 2021-07-06 PROBLEM — N13.2 URETERAL STONE WITH HYDRONEPHROSIS: Status: ACTIVE | Noted: 2021-07-05

## 2021-07-06 PROBLEM — F12.10 CANNABIS ABUSE WITHOUT COMPLICATION: Status: ACTIVE | Noted: 2021-07-06

## 2021-07-06 PROBLEM — R31.0 HEMATURIA, GROSS: Status: ACTIVE | Noted: 2021-07-06

## 2021-07-06 LAB
ANION GAP SERPL CALCULATED.3IONS-SCNC: 11 MEQ/L (ref 8–16)
BASOPHILS # BLD: 0.1 %
BASOPHILS # BLD: 0.5 %
BASOPHILS ABSOLUTE: 0 THOU/MM3 (ref 0–0.1)
BASOPHILS ABSOLUTE: 0.1 THOU/MM3 (ref 0–0.1)
BUN BLDV-MCNC: 10 MG/DL (ref 7–22)
CALCIUM SERPL-MCNC: 9.2 MG/DL (ref 8.5–10.5)
CHLORIDE BLD-SCNC: 106 MEQ/L (ref 98–111)
CO2: 25 MEQ/L (ref 23–33)
CREAT SERPL-MCNC: 0.8 MG/DL (ref 0.4–1.2)
DIFFERENTIAL TYPE: ABNORMAL
EOSINOPHIL # BLD: 0.4 %
EOSINOPHIL # BLD: 1.3 %
EOSINOPHILS ABSOLUTE: 0 THOU/MM3 (ref 0–0.4)
EOSINOPHILS ABSOLUTE: 0.2 THOU/MM3 (ref 0–0.4)
ERYTHROCYTE [DISTWIDTH] IN BLOOD BY AUTOMATED COUNT: 12.5 % (ref 11.5–14.5)
ERYTHROCYTE [DISTWIDTH] IN BLOOD BY AUTOMATED COUNT: 12.7 % (ref 11.5–14.5)
ERYTHROCYTE [DISTWIDTH] IN BLOOD BY AUTOMATED COUNT: 38.5 FL (ref 35–45)
ERYTHROCYTE [DISTWIDTH] IN BLOOD BY AUTOMATED COUNT: 41.4 FL (ref 35–45)
GFR SERPL CREATININE-BSD FRML MDRD: > 90 ML/MIN/1.73M2
GLUCOSE BLD-MCNC: 78 MG/DL (ref 70–108)
HCT VFR BLD CALC: 41.8 % (ref 42–52)
HCT VFR BLD CALC: 50.3 % (ref 42–52)
HEMOGLOBIN: 14 GM/DL (ref 14–18)
HEMOGLOBIN: 17.3 GM/DL (ref 14–18)
IMMATURE GRANS (ABS): 0.04 THOU/MM3 (ref 0–0.07)
IMMATURE GRANS (ABS): 0.04 THOU/MM3 (ref 0–0.07)
IMMATURE GRANULOCYTES: 0.3 %
IMMATURE GRANULOCYTES: 0.4 %
LYMPHOCYTES # BLD: 28.5 %
LYMPHOCYTES # BLD: 50.1 %
LYMPHOCYTES ABSOLUTE: 2.6 THOU/MM3 (ref 1–4.8)
LYMPHOCYTES ABSOLUTE: 6.8 THOU/MM3 (ref 1–4.8)
MCH RBC QN AUTO: 29.6 PG (ref 26–33)
MCH RBC QN AUTO: 30.2 PG (ref 26–33)
MCHC RBC AUTO-ENTMCNC: 33.5 GM/DL (ref 32.2–35.5)
MCHC RBC AUTO-ENTMCNC: 34.4 GM/DL (ref 32.2–35.5)
MCV RBC AUTO: 86.1 FL (ref 80–94)
MCV RBC AUTO: 90.3 FL (ref 80–94)
MONOCYTES # BLD: 7.6 %
MONOCYTES # BLD: 8.7 %
MONOCYTES ABSOLUTE: 0.8 THOU/MM3 (ref 0.4–1.3)
MONOCYTES ABSOLUTE: 1 THOU/MM3 (ref 0.4–1.3)
NUCLEATED RED BLOOD CELLS: 0 /100 WBC
NUCLEATED RED BLOOD CELLS: 0 /100 WBC
OSMOLALITY CALCULATION: 281 MOSMOL/KG (ref 275–300)
PATHOLOGIST REVIEW: ABNORMAL
PLATELET # BLD: 197 THOU/MM3 (ref 130–400)
PLATELET # BLD: 385 THOU/MM3 (ref 130–400)
PMV BLD AUTO: 10.2 FL (ref 9.4–12.4)
PMV BLD AUTO: 10.2 FL (ref 9.4–12.4)
POTASSIUM REFLEX MAGNESIUM: 4 MEQ/L (ref 3.5–5.2)
RBC # BLD: 4.63 MILL/MM3 (ref 4.7–6.1)
RBC # BLD: 5.84 MILL/MM3 (ref 4.7–6.1)
SEG NEUTROPHILS: 40.2 %
SEG NEUTROPHILS: 61.9 %
SEGMENTED NEUTROPHILS ABSOLUTE COUNT: 5.4 THOU/MM3 (ref 1.8–7.7)
SEGMENTED NEUTROPHILS ABSOLUTE COUNT: 5.6 THOU/MM3 (ref 1.8–7.7)
SODIUM BLD-SCNC: 142 MEQ/L (ref 135–145)
WBC # BLD: 13.5 THOU/MM3 (ref 4.8–10.8)
WBC # BLD: 9 THOU/MM3 (ref 4.8–10.8)

## 2021-07-06 PROCEDURE — 99239 HOSP IP/OBS DSCHRG MGMT >30: CPT | Performed by: INTERNAL MEDICINE

## 2021-07-06 PROCEDURE — 36415 COLL VENOUS BLD VENIPUNCTURE: CPT

## 2021-07-06 PROCEDURE — 6370000000 HC RX 637 (ALT 250 FOR IP): Performed by: EMERGENCY MEDICINE

## 2021-07-06 PROCEDURE — 3700000001 HC ADD 15 MINUTES (ANESTHESIA): Performed by: UROLOGY

## 2021-07-06 PROCEDURE — C9113 INJ PANTOPRAZOLE SODIUM, VIA: HCPCS | Performed by: FAMILY MEDICINE

## 2021-07-06 PROCEDURE — C1769 GUIDE WIRE: HCPCS | Performed by: UROLOGY

## 2021-07-06 PROCEDURE — 0T778DZ DILATION OF LEFT URETER WITH INTRALUMINAL DEVICE, VIA NATURAL OR ARTIFICIAL OPENING ENDOSCOPIC: ICD-10-PCS | Performed by: UROLOGY

## 2021-07-06 PROCEDURE — C1758 CATHETER, URETERAL: HCPCS | Performed by: UROLOGY

## 2021-07-06 PROCEDURE — 6360000002 HC RX W HCPCS: Performed by: FAMILY MEDICINE

## 2021-07-06 PROCEDURE — C2617 STENT, NON-COR, TEM W/O DEL: HCPCS | Performed by: UROLOGY

## 2021-07-06 PROCEDURE — 7100000000 HC PACU RECOVERY - FIRST 15 MIN: Performed by: UROLOGY

## 2021-07-06 PROCEDURE — 3600000003 HC SURGERY LEVEL 3 BASE: Performed by: UROLOGY

## 2021-07-06 PROCEDURE — 99253 IP/OBS CNSLTJ NEW/EST LOW 45: CPT | Performed by: UROLOGY

## 2021-07-06 PROCEDURE — 2720000010 HC SURG SUPPLY STERILE: Performed by: UROLOGY

## 2021-07-06 PROCEDURE — 3700000000 HC ANESTHESIA ATTENDED CARE: Performed by: UROLOGY

## 2021-07-06 PROCEDURE — 6370000000 HC RX 637 (ALT 250 FOR IP): Performed by: HOSPITALIST

## 2021-07-06 PROCEDURE — 0TC78ZZ EXTIRPATION OF MATTER FROM LEFT URETER, VIA NATURAL OR ARTIFICIAL OPENING ENDOSCOPIC: ICD-10-PCS | Performed by: UROLOGY

## 2021-07-06 PROCEDURE — 3600000013 HC SURGERY LEVEL 3 ADDTL 15MIN: Performed by: UROLOGY

## 2021-07-06 PROCEDURE — 6370000000 HC RX 637 (ALT 250 FOR IP): Performed by: NURSE PRACTITIONER

## 2021-07-06 PROCEDURE — 2500000003 HC RX 250 WO HCPCS: Performed by: NURSE ANESTHETIST, CERTIFIED REGISTERED

## 2021-07-06 PROCEDURE — 85025 COMPLETE CBC W/AUTO DIFF WBC: CPT

## 2021-07-06 PROCEDURE — 82365 CALCULUS SPECTROSCOPY: CPT

## 2021-07-06 PROCEDURE — 6360000002 HC RX W HCPCS: Performed by: NURSE ANESTHETIST, CERTIFIED REGISTERED

## 2021-07-06 PROCEDURE — 80048 BASIC METABOLIC PNL TOTAL CA: CPT

## 2021-07-06 PROCEDURE — 87086 URINE CULTURE/COLONY COUNT: CPT

## 2021-07-06 PROCEDURE — 51798 US URINE CAPACITY MEASURE: CPT

## 2021-07-06 PROCEDURE — 2709999900 HC NON-CHARGEABLE SUPPLY: Performed by: UROLOGY

## 2021-07-06 PROCEDURE — 7100000001 HC PACU RECOVERY - ADDTL 15 MIN: Performed by: UROLOGY

## 2021-07-06 DEVICE — URETERAL STENT
Type: IMPLANTABLE DEVICE | Status: FUNCTIONAL
Brand: PERCUFLEX™ PLUS

## 2021-07-06 RX ORDER — CEFAZOLIN SODIUM 1 G/3ML
INJECTION, POWDER, FOR SOLUTION INTRAMUSCULAR; INTRAVENOUS PRN
Status: DISCONTINUED | OUTPATIENT
Start: 2021-07-06 | End: 2021-07-06 | Stop reason: SDUPTHER

## 2021-07-06 RX ORDER — ONDANSETRON 2 MG/ML
INJECTION INTRAMUSCULAR; INTRAVENOUS PRN
Status: DISCONTINUED | OUTPATIENT
Start: 2021-07-06 | End: 2021-07-06 | Stop reason: SDUPTHER

## 2021-07-06 RX ORDER — FENTANYL CITRATE 50 UG/ML
INJECTION, SOLUTION INTRAMUSCULAR; INTRAVENOUS PRN
Status: DISCONTINUED | OUTPATIENT
Start: 2021-07-06 | End: 2021-07-06 | Stop reason: SDUPTHER

## 2021-07-06 RX ORDER — LIDOCAINE HCL/PF 100 MG/5ML
SYRINGE (ML) INJECTION PRN
Status: DISCONTINUED | OUTPATIENT
Start: 2021-07-06 | End: 2021-07-06 | Stop reason: SDUPTHER

## 2021-07-06 RX ORDER — KETOROLAC TROMETHAMINE 10 MG/1
10 TABLET, FILM COATED ORAL EVERY 6 HOURS PRN
Qty: 20 TABLET | Refills: 0 | Status: SHIPPED | OUTPATIENT
Start: 2021-07-06 | End: 2021-09-30

## 2021-07-06 RX ORDER — LABETALOL 20 MG/4 ML (5 MG/ML) INTRAVENOUS SYRINGE
10 EVERY 10 MIN PRN
Status: DISCONTINUED | OUTPATIENT
Start: 2021-07-06 | End: 2021-07-06 | Stop reason: HOSPADM

## 2021-07-06 RX ORDER — MORPHINE SULFATE 2 MG/ML
2 INJECTION, SOLUTION INTRAMUSCULAR; INTRAVENOUS EVERY 5 MIN PRN
Status: DISCONTINUED | OUTPATIENT
Start: 2021-07-06 | End: 2021-07-06 | Stop reason: HOSPADM

## 2021-07-06 RX ORDER — MIDAZOLAM HYDROCHLORIDE 1 MG/ML
INJECTION INTRAMUSCULAR; INTRAVENOUS PRN
Status: DISCONTINUED | OUTPATIENT
Start: 2021-07-06 | End: 2021-07-06 | Stop reason: SDUPTHER

## 2021-07-06 RX ORDER — PROPOFOL 10 MG/ML
INJECTION, EMULSION INTRAVENOUS PRN
Status: DISCONTINUED | OUTPATIENT
Start: 2021-07-06 | End: 2021-07-06 | Stop reason: SDUPTHER

## 2021-07-06 RX ORDER — NICOTINE 21 MG/24HR
1 PATCH, TRANSDERMAL 24 HOURS TRANSDERMAL DAILY
Status: DISCONTINUED | OUTPATIENT
Start: 2021-07-06 | End: 2021-07-06 | Stop reason: HOSPADM

## 2021-07-06 RX ORDER — CIPROFLOXACIN 500 MG/1
500 TABLET, FILM COATED ORAL 2 TIMES DAILY
Qty: 10 TABLET | Refills: 0 | Status: SHIPPED | OUTPATIENT
Start: 2021-07-06 | End: 2021-07-11

## 2021-07-06 RX ORDER — DEXAMETHASONE SODIUM PHOSPHATE 10 MG/ML
INJECTION, EMULSION INTRAMUSCULAR; INTRAVENOUS PRN
Status: DISCONTINUED | OUTPATIENT
Start: 2021-07-06 | End: 2021-07-06 | Stop reason: SDUPTHER

## 2021-07-06 RX ORDER — PHENAZOPYRIDINE HYDROCHLORIDE 100 MG/1
100 TABLET, FILM COATED ORAL 3 TIMES DAILY PRN
Qty: 9 TABLET | Refills: 0 | Status: SHIPPED | OUTPATIENT
Start: 2021-07-06 | End: 2021-07-09

## 2021-07-06 RX ORDER — FENTANYL CITRATE 50 UG/ML
50 INJECTION, SOLUTION INTRAMUSCULAR; INTRAVENOUS EVERY 5 MIN PRN
Status: DISCONTINUED | OUTPATIENT
Start: 2021-07-06 | End: 2021-07-06 | Stop reason: HOSPADM

## 2021-07-06 RX ADMIN — TAMSULOSIN HYDROCHLORIDE 0.4 MG: 0.4 CAPSULE ORAL at 08:15

## 2021-07-06 RX ADMIN — MIDAZOLAM 2 MG: 1 INJECTION INTRAMUSCULAR; INTRAVENOUS at 12:37

## 2021-07-06 RX ADMIN — FENTANYL CITRATE 50 MCG: 50 INJECTION, SOLUTION INTRAMUSCULAR; INTRAVENOUS at 12:40

## 2021-07-06 RX ADMIN — KETOROLAC TROMETHAMINE 30 MG: 30 INJECTION, SOLUTION INTRAMUSCULAR; INTRAVENOUS at 01:03

## 2021-07-06 RX ADMIN — PROPOFOL 200 MG: 10 INJECTION, EMULSION INTRAVENOUS at 12:40

## 2021-07-06 RX ADMIN — FENTANYL CITRATE 50 MCG: 50 INJECTION, SOLUTION INTRAMUSCULAR; INTRAVENOUS at 12:56

## 2021-07-06 RX ADMIN — DEXAMETHASONE SODIUM PHOSPHATE 10 MG: 10 INJECTION, EMULSION INTRAMUSCULAR; INTRAVENOUS at 12:44

## 2021-07-06 RX ADMIN — KETOROLAC TROMETHAMINE 30 MG: 30 INJECTION, SOLUTION INTRAMUSCULAR; INTRAVENOUS at 16:06

## 2021-07-06 RX ADMIN — PROPOFOL 100 MG: 10 INJECTION, EMULSION INTRAVENOUS at 12:56

## 2021-07-06 RX ADMIN — FENTANYL CITRATE 50 MCG: 50 INJECTION, SOLUTION INTRAMUSCULAR; INTRAVENOUS at 12:59

## 2021-07-06 RX ADMIN — Medication 100 MG: at 12:40

## 2021-07-06 RX ADMIN — KETOROLAC TROMETHAMINE 30 MG: 30 INJECTION, SOLUTION INTRAMUSCULAR; INTRAVENOUS at 08:15

## 2021-07-06 RX ADMIN — CEFAZOLIN 2000 MG: 1 INJECTION, POWDER, FOR SOLUTION INTRAMUSCULAR; INTRAVENOUS at 12:40

## 2021-07-06 RX ADMIN — IBUPROFEN 400 MG: 200 TABLET, FILM COATED ORAL at 08:17

## 2021-07-06 RX ADMIN — ONDANSETRON HYDROCHLORIDE 4 MG: 4 INJECTION, SOLUTION INTRAMUSCULAR; INTRAVENOUS at 12:44

## 2021-07-06 RX ADMIN — PANTOPRAZOLE SODIUM 40 MG: 40 INJECTION, POWDER, FOR SOLUTION INTRAVENOUS at 08:15

## 2021-07-06 ASSESSMENT — PULMONARY FUNCTION TESTS
PIF_VALUE: 2
PIF_VALUE: 1
PIF_VALUE: 2
PIF_VALUE: 1
PIF_VALUE: 16
PIF_VALUE: 15
PIF_VALUE: 2
PIF_VALUE: 15
PIF_VALUE: 4
PIF_VALUE: 1
PIF_VALUE: 2
PIF_VALUE: 5
PIF_VALUE: 16
PIF_VALUE: 1
PIF_VALUE: 1
PIF_VALUE: 16
PIF_VALUE: 5
PIF_VALUE: 3
PIF_VALUE: 4
PIF_VALUE: 1
PIF_VALUE: 3
PIF_VALUE: 2
PIF_VALUE: 1
PIF_VALUE: 16
PIF_VALUE: 3
PIF_VALUE: 3
PIF_VALUE: 2
PIF_VALUE: 16
PIF_VALUE: 1
PIF_VALUE: 2
PIF_VALUE: 16
PIF_VALUE: 2
PIF_VALUE: 2
PIF_VALUE: 4
PIF_VALUE: 3
PIF_VALUE: 24
PIF_VALUE: 2
PIF_VALUE: 3

## 2021-07-06 ASSESSMENT — PAIN SCALES - WONG BAKER
WONGBAKER_NUMERICALRESPONSE: 0

## 2021-07-06 ASSESSMENT — PAIN SCALES - GENERAL
PAINLEVEL_OUTOF10: 2
PAINLEVEL_OUTOF10: 7
PAINLEVEL_OUTOF10: 5
PAINLEVEL_OUTOF10: 0

## 2021-07-06 ASSESSMENT — LIFESTYLE VARIABLES: SMOKING_STATUS: 1

## 2021-07-06 NOTE — PROCEDURES
A Bladder scan was performed at 0619 . The patient's last void was at 0605 . The residual amount was measured to be 0 ML. Report of results was given to [facility].

## 2021-07-06 NOTE — PROGRESS NOTES
Pt admitted to  5K28 per hospitalist from ED. Complains of stomach pain. IV site free of s/s of infection or infiltration. Instructed in use of call light, tv controls, bed controls and 5 minute rule scripted to pt with understanding verbalized. Fall and safety brochure discussed with pt.

## 2021-07-06 NOTE — DISCHARGE SUMMARY
of taste, loss of smell, hematemesis, or any other constitutional symptoms other than mentioned above. I will admit due to left hydronephrosis with urology consult and further evaluation with management. Vitals:  Vitals:    07/06/21 1345 07/06/21 1400 07/06/21 1415 07/06/21 1540   BP: 106/68 103/67 (!) 130/99 114/66   Pulse: 66 59  61   Resp: 18 18  18   Temp:    97.6 °F (36.4 °C)   TempSrc:    Oral   SpO2: 98% 96%  98%       Weight:       24 hour intake/output:    Intake/Output Summary (Last 24 hours) at 7/6/2021 2257  Last data filed at 7/6/2021 1359  Gross per 24 hour   Intake 0 ml   Output 500 ml   Net -500 ml       Physical Exam  Vitals and nursing note reviewed. Constitutional:       General: He is not in acute distress. Appearance: Normal appearance. He is normal weight. He is not ill-appearing, toxic-appearing or diaphoretic. HENT:      Head: Normocephalic and atraumatic. Right Ear: External ear normal.      Left Ear: External ear normal.      Nose: Nose normal. No congestion or rhinorrhea. Mouth/Throat:      Mouth: Mucous membranes are moist.      Pharynx: Oropharynx is clear. No oropharyngeal exudate or posterior oropharyngeal erythema. Eyes:      General: No scleral icterus. Right eye: No discharge. Left eye: No discharge. Extraocular Movements: Extraocular movements intact. Conjunctiva/sclera: Conjunctivae normal.      Pupils: Pupils are equal, round, and reactive to light. Neck:      Vascular: No carotid bruit. Cardiovascular:      Rate and Rhythm: Normal rate and regular rhythm. Pulses: Normal pulses. Heart sounds: Normal heart sounds. No murmur heard. No friction rub. No gallop. Pulmonary:      Effort: Pulmonary effort is normal. No respiratory distress. Breath sounds: Normal breath sounds. No stridor. No wheezing, rhonchi or rales. Chest:      Chest wall: No tenderness. Abdominal:      General: Abdomen is flat.  Bowel sounds are normal. There is no distension. Palpations: Abdomen is soft. There is no mass. Tenderness: There is no abdominal tenderness. There is left CVA tenderness. There is no right CVA tenderness, guarding or rebound. Hernia: No hernia is present. Musculoskeletal:         General: No swelling, tenderness, deformity or signs of injury. Normal range of motion. Cervical back: Normal range of motion and neck supple. No rigidity or tenderness. Right lower leg: No edema. Left lower leg: No edema. Lymphadenopathy:      Cervical: No cervical adenopathy. Skin:     General: Skin is warm and dry. Coloration: Skin is not jaundiced or pale. Findings: No bruising, erythema, lesion or rash. Neurological:      General: No focal deficit present. Mental Status: He is alert and oriented to person, place, and time. Mental status is at baseline. Cranial Nerves: No cranial nerve deficit. Sensory: No sensory deficit. Motor: No weakness. Coordination: Coordination normal.      Gait: Gait normal.      Deep Tendon Reflexes: Reflexes normal.   Psychiatric:         Mood and Affect: Mood normal.         Behavior: Behavior normal.         Thought Content: Thought content normal.         Judgment: Judgment normal.     CBC:    Lab Results   Component Value Date    WBC 9.0 07/06/2021    HGB 14.0 07/06/2021    HCT 41.8 07/06/2021     07/06/2021       Renal:    Lab Results   Component Value Date     07/06/2021    K 4.0 07/06/2021     07/06/2021    CO2 25 07/06/2021    BUN 10 07/06/2021    CREATININE 0.8 07/06/2021    CALCIUM 9.2 07/06/2021       URINALYSIS. Results for Julieanne Frankel (MRN [id number]) as of 7/6/2021 16:32   Ref.  Range 7/5/2021 17:25   Color, UA Latest Ref Range: STRAW-YELLOW  RED (A)   Glucose, UA Latest Ref Range: NEGATIVE mg/dl NEGATIVE   Bilirubin, Urine Latest Ref Range: NEGATIVE  SMALL (A)   Ketones, Urine Latest Ref Range: NEGATIVE  >= 80 (A)   Blood, Urine Latest Ref Range: NEGATIVE  LARGE (A)   pH, UA Latest Ref Range: 5.0 - 9.0  6.0   Protein, UA Latest Ref Range: NEGATIVE  30 (A)   Urobilinogen, Urine Latest Ref Range: 0.0 - 1.0 eu/dl 0.2   Nitrite, Urine Latest Ref Range: NEGATIVE  NEGATIVE   Leukocyte Esterase, Urine Latest Ref Range: NEGATIVE  NEGATIVE   Casts UA Latest Ref Range: NONE SEEN /lpf NONE SEEN   Mucus, UA Latest Ref Range: NONE SEEN/THREA  THREADS   WBC, UA Latest Ref Range: 0-4/hpf /hpf 2-4   RBC, UA Latest Ref Range: 0-2/hpf /hpf > 200   Epithelial Cells, UA Latest Ref Range: 3-5/hpf /hpf 0-2   Bacteria, UA Latest Ref Range: FEW/NONE SEEN /hpf NONE   Crystals, UA Latest Ref Range: NONE SEEN  OXALATE   Ictotest Latest Ref Range: NEGATIVE  NEGATIVE   Character, Urine Latest Ref Range: CLEAR-SL CLOUD  TURBID (A)   Specific Gravity, Urine Latest Ref Range: 1.002 - 1.030  >= 1.030       MICROBIOLOGY AND PATHOLOGY STUDIES. None. TOXICOLOGY. Results for Jose Nowak (MRN [id number]) as of 7/6/2021 16:32   Ref. Range 7/5/2021 17:25   AMPHETAMINE+METHAMPHETAMINE URINE SCREEN Latest Ref Range: NEGATIVE  Negative   Barbiturate Quant, Ur Latest Ref Range: NEGATIVE  Negative   Benzodiazepine Quant, Ur Latest Ref Range: NEGATIVE  Negative   Cannabinoid Quant, Ur Latest Ref Range: NEGATIVE  POSITIVE   Cocaine Metab Quant, Ur Latest Ref Range: NEGATIVE  Negative   PCP Quant, Ur Latest Ref Range: NEGATIVE  Negative   Opiates, Urine Latest Ref Range: NEGATIVE  POSITIVE   Oxycodone Latest Ref Range: NEGATIVE  Negative       PROCEDURES. CYSTO, LEFT URETEROSCOPY, BASKET RETRIEVAL OF STONES, STENT PLACEMENT -7/6/2021. ENDOSCOPE STUDIES. None. RADIOLOGY STUDIES. CT abdomen and Pelvis w/o contrast-7/5/2021. FINDINGS:    The visualized aspects of the lung bases are clear.        The base of the heart is within appropriate limits.     The liver is grossly normal.     There is mild splenomegaly, unchanged since previous study dated 20 May 2009. .     The adrenal glands and pancreas are grossly normal.     The gallbladder is normal.      There is left-sided hydronephrosis and hydroureter secondary to a 2.2 mm stone at the left   ureterovesical junction. The right kidney is unremarkable.       No abnormalities of the small bowel loops are noted. The IVC and aorta are of normal caliber. There are  small bilateral inguinal lymph nodes present. There is right inguinal hernia containing fat. .      The urinary bladder is normal.     The prostate gland measures 3.6 x 3.5 cm in size. There is no pelvic free fluid. The colon is grossly normal. .     No suspicious osseous lesions are present.      IMPRESSION:  1. Left-sided hydronephrosis and hydroureter secondary to a 3.2 mm stone at the left       ureterovesical junction. 2. Mild splenomegaly, unchanged since remote study dated 20 May 2009. 3. Small bilateral inguinal lymph nodes present. 4. Right inguinal hernia containing fat. 5. Otherwise negative CT scan of the abdomen and pelvis. Consults:     IP CONSULT TO UROLOGY    Disposition:    [x] Home       [] TCU       [] Rehab       [] Psych       [] SNF       [] [facility]       [] Other-    Condition at Discharge: Stable    Code Status:  Prior     Patient Instructions:      Discharge lab work: Activity: activity as tolerated    Diet: No diet orders on file      Follow-up visits:     No follow-up provider specified.        Discharge Medications:      Selvin Beltran 166 Medication Instructions [id number]    Printed on:07/06/21 2330   Medication Information                      ciprofloxacin (CIPRO) 500 MG tablet  Take 1 tablet by mouth 2 times daily for 5 days             ketorolac (TORADOL) 10 MG tablet  Take 1 tablet by mouth every 6 hours as needed for Pain             phenazopyridine (PYRIDIUM) 100 MG tablet  Take 1 tablet by mouth 3 times daily as needed for Pain                 Time Spent on discharge is more than 30 minutes in the examination, evaluation, counseling and review of medications and discharge plan. Signed: Thank you Martha Peerz DO for the opportunity to be involved in this patient's care.     Electronically signed by Dayday Peterson MD on 7/6/2021 at 10:57 PM

## 2021-07-06 NOTE — PROGRESS NOTES
Patient return from OR at this time, Alert and oriented no acute distress noted. Scant amount of bleeding noted from penis with strings in place. Patient denies pain at this time.

## 2021-07-06 NOTE — CARE COORDINATION
7/6/21, 2:25 PM EDT  DISCHARGE PLANNING EVALUATION:    Guevara Cavazos       Admitted: 7/5/2021/ [address] day: 1   Location: [facility]28/028-A Reason for admit: Hydronephrosis, left [N13.30]   PMH:  has a past medical history of Staph infection. Procedure: 7-6 -21 Cystoscopy, left ureteroscopy, laser lithotripsy, basket retrieval of stone fragments, and left ureteral stent placement per Dr Kaveh Acosta. Barriers to Discharge: To ER with left flank pain. Hydronephrosis. Urology consult. IVF. Pain control. Antiemetics, anti-inflammatories, antibiotics. Strain urine. PCP: Mushtaq Severe, DO  Readmission Risk Score: 5%    Patient Goals/Plan/Treatment Preferences: Met with pt this afternoon following his procedure, cystoscopy and stent. From home with his parents. He does not currently work. He has no home medical services or DME. He has a PCP, he drives and he is able to obtain meds if needed. No discharge needs voiced. Transportation/Food Security/Housekeeping Addressed:  No issues identified.

## 2021-07-06 NOTE — PROGRESS NOTES
[id number] pt arrived to PACU, awakens to voice. VSS  1332 pt awake in bed, denies pain  1347 pt states slight pain but tolerable.  Meets criteria for discharge from PACU, transported to [facility]

## 2021-07-06 NOTE — FLOWSHEET NOTE
07/06/21 0935   Encounter Summary   Services provided to: Patient and family together   Referral/Consult From: Rounding   Continue Visiting Yes  (7/6)   Complexity of Encounter Moderate   Length of Encounter 15 minutes   Spiritual Assessment Completed Yes   Routine   Type Initial   Assessment Calm; Approachable   Intervention Active listening;Nurtured hope;Prayer   Outcome Acceptance;Comfort;Expressed gratitude   Spiritual/Mandaeism   Type Spiritual support   Assessment: In my encounter with the 37 yr old patient the pts family was supportively present. While rounding the unit 5K,  I provided spiritual care to patient and their family through conversation, I also came to assess their spiritual needs present. The pt spoke about his past medical history and hospitalization. He was admitted due to hydronephrosis/ kidney issues. Interventions:  I provided, prayer, emotional support and words of comfort.  provided a listening presence and encouraged pt to share their beliefs and how they support him during their hospitalization. Outcomes: The patient was encouraged and didnt share any further spiritual needs at this time. The pt remains optimistic and hopeful. The pt shared that they were appreciative for the support. Plan:  Chaplains will follow-up at a later time for assessment of any spiritual care needs present.

## 2021-07-06 NOTE — CONSULTS
[address]  [place] [address]  Dept: [phone]  Loc: [phone]  Visit Date: 7/5/2021    Urology Consult Note    Reason for Consult:  Left hydronephrosis  Requesting Physician:  Prieto Clements    History Obtained From:  patient    Chief Complaint: left lower abdominal pain    HISTORY OF PRESENT ILLNESS:                The patient is a 37 y.o. male with significant past medical history of see below who presented to ER with left lower abdominal pain that began Sunday morning. He also reports no voiding well a few days prior. Pain is controlled with Toradol. CT shows:    Impression       1. Left-sided hydronephrosis and hydroureter secondary to a 3.2 mm stone at the left ureterovesical junction. 2. Mild splenomegaly, unchanged since remote study dated 20 May 2009. 3. Small bilateral inguinal lymph nodes present. 4. Right inguinal hernia containing fat. 5. Otherwise negative CT scan of the abdomen and pelvis. Ua shows large blood, negative for infection  + for cannabinoids and opiates    Past Medical History:        Diagnosis Date    Staph infection 2014     Past Surgical History:        Procedure Laterality Date    APPENDECTOMY      DENTAL SURGERY      WISDOM TOOTH EXTRACTION       Allergies:  Patient has no known allergies.   Social History:  Social History     Socioeconomic History    Marital status: Single     Spouse name: Not on file    Number of children: 2    Years of education: Not on file    Highest education level: Not on file   Occupational History    Not on file   Tobacco Use    Smoking status: Current Every Day Smoker     Packs/day: 0.50     Years: 13.00     Pack years: 6.50     Types: Cigarettes     Start date: 1/10/2007    Smokeless tobacco: Former User     Types: Chew    Tobacco comment: always trying to quit   Vaping Use    Vaping Use: Never used   Substance and Sexual Activity    Alcohol use: No    Drug use: No    Sexual activity: Never   Other Topics Concern    Not on file   Social History Narrative    Not on file     Social Determinants of Health     Financial Resource Strain:     Difficulty of Paying Living Expenses:    Food Insecurity:     Worried About Running Out of Food in the Last Year:     [address] in the Last Year:    Transportation Needs:     Lack of Transportation (Medical):  Lack of Transportation (Non-Medical):    Physical Activity:     Days of Exercise per Week:     Minutes of Exercise per Session:    Stress:     Feeling of Stress :    Social Connections:     Frequency of Communication with Friends and Family:     Frequency of Social Gatherings with Friends and Family:     Attends Christian Services:     Active Member of Clubs or Organizations:     Attends Club or Organization Meetings:     Marital Status:    Intimate Partner Violence:     Fear of Current or Ex-Partner:     Emotionally Abused:     Physically Abused:     Sexually Abused:      Family History:       Problem Relation Age of Onset    High Blood Pressure Father     Asthma Father        ROS:  Constitutional: Negative for chills, fatigue, fever, or weight loss. Eyes: Denies reported visual changes. ENT: Denies headache, difficulty swallowing, earache, and nosebleeds. Cardiovascular: Negative for chest pain, palpitations, tachycardia or edema. Respiratory: Denies cough or SOB. GI:++LL abdominal pain, +n/v, +hematuria  : see HPI. Musculoskeletal: Patient denies low back pain or painful or reduced range of ROM. Neurological: The patient denies any symptoms of neurological impairment or TIA. Psychiatric: ++anxiety  Skin: Denies rash or lesions. All remaining ROS negative. PHYSICAL EXAM:  VITALS:  BP (!) 107/54   Pulse (!) 46   Temp 97.9 °F (36.6 °C) (Oral)   Resp 18   SpO2 97% . Nursing note and vitals reviewed.     Constitutional: Alert and oriented times x3, no acute distress, and cooperative to examination with appropriate mood and affect. HEENT:   Head:         Normocephalic and atraumatic. Mouth/Throat:          Mucous membranes are normal.   Eyes:         EOM are normal. No scleral icterus. Nose:    The external appearance of the nose is normal  Ears: The ears appear normal to external inspection. Cardiovascular:       Normal rate, regular rhythm. Pulmonary/Chest:  Normal respiratory rate and rhthym. No use of accessory muscles. Lungs clear bilaterally. Abdominal:          Soft. No tenderness. Active bowel sounds. Left lower abdominal pain into grown, no CVA tenderness             Genitalia:    Voiding spontaneously   Musculoskeletal:    Normal range of motion. He exhibits no edema or tenderness of lower extremities. Extremities:    No cyanosis, clubbing, or edema present. Neurological:    Alert and oriented.      DATA:  CBC:   Lab Results   Component Value Date    WBC 9.0 07/06/2021    RBC 4.63 07/06/2021    HGB 14.0 07/06/2021    HCT 41.8 07/06/2021    MCV 90.3 07/06/2021    MCH 30.2 07/06/2021    MCHC 33.5 07/06/2021    RDW 13.7 11/07/2017     07/06/2021    MPV 10.2 07/06/2021     BMP:    Lab Results   Component Value Date     07/06/2021    K 4.0 07/06/2021     07/06/2021    CO2 25 07/06/2021    BUN 10 07/06/2021    CREATININE 0.8 07/06/2021    CALCIUM 9.2 07/06/2021    LABGLOM >90 07/06/2021    GLUCOSE 78 07/06/2021     BUN/Creatinine:    Lab Results   Component Value Date    BUN 10 07/06/2021    CREATININE 0.8 07/06/2021     Magnesium:  No results found for: MG  Phosphorus:  No results found for: PHOS  PT/INR:  No results found for: PROTIME, INR  U/A:    Lab Results   Component Value Date    COLORU RED 07/05/2021    PHUR 6.0 07/05/2021    WBCUA 2-4 07/05/2021    RBCUA > 200 07/05/2021    MUCUS THREADS 07/05/2021    BACTERIA NONE 07/05/2021    LEUKOCYTESUR NEGATIVE 07/05/2021    UROBILINOGEN 0.2 07/05/2021    BILIRUBINUR SMALL 07/05/2021    BLOODU LARGE 07/05/2021 GLUCOSEU NEGATIVE 07/05/2021       Imaging: The patient has had a CT Without Contrast which I have independently reviewed along with its accompanying report. The study demonstrates   Narrative   PROCEDURE: CT ABDOMEN PELVIS WO CONTRAST       CLINICAL INFORMATION: lower abd pain .       COMPARISON: CT scan of the abdomen and pelvis dated 20 May 2009. .       TECHNIQUE: Axial 5 mm CT images were obtained through the abdomen and pelvis. No contrast was given. Coronal and sagittal reconstructions were obtained.       All CT scans at this facility use dose modulation, iterative reconstruction, and/or weight-based dosing when appropriate to reduce radiation dose to as low as reasonably achievable.       FINDINGS:           The visualized aspects of the lung bases are clear.   The base of the heart is within appropriate limits.       The liver is grossly normal. There is mild splenomegaly, unchanged since previous study dated 20 May 2009. . The adrenal glands and pancreas are grossly normal. The gallbladder is normal.  There is left-sided hydronephrosis and hydroureter secondary to a    2.2 mm stone at the left ureterovesical junction. The right kidney is unremarkable.            No abnormalities of the small bowel loops are noted.  The IVC and aorta are of normal caliber. There are  small bilateral inguinal lymph nodes present. There is right inguinal hernia containing fat. .           The urinary bladder is normal. The prostate gland measures 3.6 x 3.5 cm in size. There is no pelvic free fluid.  The colon is grossly normal. . No suspicious osseous lesions are present.                   Impression       1. Left-sided hydronephrosis and hydroureter secondary to a 3.2 mm stone at the left ureterovesical junction. 2. Mild splenomegaly, unchanged since remote study dated 20 May 2009. 3. Small bilateral inguinal lymph nodes present. 4. Right inguinal hernia containing fat.    5. Otherwise negative CT scan of the

## 2021-07-06 NOTE — ANESTHESIA PRE PROCEDURE
Department of Anesthesiology  Preprocedure Note       Name:  Will King   Age:  37 y.o.  :  1985                                          MRN:  [id number]         Date:  2021      Surgeon: Andres Key):  Jose Platt MD    Procedure: Procedure(s):  CYSTO, LEFT URETEROSCOPY, POSS LASER LITHOTRIPSY, BASKET RETRIEVAL OF STONES, STENT    Medications prior to admission:   Prior to Admission medications    Medication Sig Start Date End Date Taking?  Authorizing Provider   Meloxicam (MOBIC PO) Take by mouth    Historical Provider, MD   ibuprofen (ADVIL;MOTRIN) 200 MG tablet Take 600 mg by mouth every 6 hours as needed for Pain    Historical Provider, MD       Current medications:    Current Facility-Administered Medications   Medication Dose Route Frequency Provider Last Rate Last Admin    nicotine (NICODERM CQ) 14 MG/24HR 1 patch  1 patch Transdermal Daily Reagan Torres MD   1 patch at 21 0814    sodium chloride flush 0.9 % injection 10 mL  10 mL Intravenous 2 times per day Nicola Price MD        sodium chloride flush 0.9 % injection 10 mL  10 mL Intravenous PRN Nicola Price MD        0.9 % sodium chloride infusion  25 mL Intravenous PRN Nicola Price MD        enoxaparin (LOVENOX) injection 40 mg  40 mg Subcutaneous Q24H Nicola Price MD        ondansetron (ZOFRAN-ODT) disintegrating tablet 4 mg  4 mg Oral Q8H PRN Nicola Price MD        Or    ondansetron (ZOFRAN) injection 4 mg  4 mg Intravenous Q6H PRN Nicola Price MD        polyethylene glycol (GLYCOLAX) packet 17 g  17 g Oral Daily PRN Nicola Price MD        acetaminophen (TYLENOL) tablet 650 mg  650 mg Oral Q6H PRN Nicola Price MD        Or    acetaminophen (TYLENOL) suppository 650 mg  650 mg Rectal Q6H PRN Nicola Price MD        0.9 % sodium chloride infusion   Intravenous Continuous Nicola Price MD 75 mL/hr at 21 2321 New Bag at 21 2321    ketorolac (TORADOL) injection 30 mg  30 mg Intravenous Q6H PRN [facility] 210 lb (95.3 kg)     There is no height or weight on file to calculate BMI.    CBC:   Lab Results   Component Value Date    WBC 9.0 07/06/2021    RBC 4.63 07/06/2021    HGB 14.0 07/06/2021    HCT 41.8 07/06/2021    MCV 90.3 07/06/2021    RDW 13.7 11/07/2017     07/06/2021       CMP:   Lab Results   Component Value Date     07/06/2021    K 4.0 07/06/2021     07/06/2021    CO2 25 07/06/2021    BUN 10 07/06/2021    CREATININE 0.8 07/06/2021    LABGLOM >90 07/06/2021    GLUCOSE 78 07/06/2021    PROT 7.7 07/05/2021    CALCIUM 9.2 07/06/2021    BILITOT 0.8 07/05/2021    ALKPHOS 75 07/05/2021    AST 14 07/05/2021    ALT 13 07/05/2021       POC Tests: No results for input(s): POCGLU, POCNA, POCK, POCCL, POCBUN, POCHEMO, POCHCT in the last 72 hours. Coags: No results found for: PROTIME, INR, APTT    HCG (If Applicable): No results found for: PREGTESTUR, PREGSERUM, HCG, HCGQUANT     ABGs: No results found for: PHART, PO2ART, IUJ3KFR, GYD4MJZ, BEART, R2KWNKIW     Type & Screen (If Applicable):  No results found for: LABABO, LABRH    Drug/Infectious Status (If Applicable):  No results found for: HIV, HEPCAB    COVID-19 Screening (If Applicable): No results found for: COVID19        Anesthesia Evaluation    Airway: Mallampati: II  TM distance: >3 FB   Neck ROM: full  Mouth opening: > = 3 FB Dental:          Pulmonary:   (+) decreased breath sounds,  current smoker                           Cardiovascular:            Rhythm: regular                      Neuro/Psych:               GI/Hepatic/Renal:   (+) renal disease: kidney stones,           Endo/Other:                     Abdominal:             Vascular: Other Findings:             Anesthesia Plan      general     ASA 2       Induction: intravenous. MIPS: Postoperative opioids intended and Prophylactic antiemetics administered. Anesthetic plan and risks discussed with patient. Plan discussed with CRNA.                   Gerardo Walton MD 7/6/2021

## 2021-07-06 NOTE — OP NOTE
Patient:  Randi Carmona  MRN: [id number]  YOB: 1985    FACILITY: [place], [place]    DATE: 7/6/2021    SURGEON: Jeromy Worrell MD     ASSISTANT: none    PREOPERATIVE DIAGNOSIS: ureteral STONE    POSTOPERATIVE DIAGNOSIS: ureteral stone    PROCEDURE PERFORMED: CYSTO, LEFT URETEROSCOPY, BASKET RETRIEVAL OF STONE, STENT PLACEMENT    ANESTHESIA: General    ESTIMATED BLOOD LOSS: 0 ml    COMPLICATIONS: None immediate    DRAINS: 6 x 26 double j stent    SPECIMENS:   ID Type Source Tests Collected by Time Destination   1 : LEFT URETERAL STONE Stone (Calculus) Ureter STONE ANALYSIS Delon Crowley MD 7/6/2021 1303        INDICATIONS FOR PROCEDURE:  The patient is a 37 y.o. male who presents today with STONES here for CYSTO, LEFT URETEROSCOPY, BASKET RETRIEVAL OF STONES, STENT PLACEMENT. After risks, benefits and alternatives of the procedure were discussed with the patient, the patient elected to proceed. DETAILS OF THE PROCEDURE:  The patient was brought back from the preoperative holding area to the operating suite, and was transferred to the operating table where the patient lay in supine position. EPC's were in place, connected to the machine and the machine was turned on before induction. General endotracheal anesthesia was induced, and patient was prepped and draped in standard surgical fashion after being placed in dorsolithotomy position. A proper timeout was performed, preoperative antibiotics were given. We began by inserting a cystoscope with a 22 Armenian sheath and 30 degree lens into the patient's urethral meatus and advancing into the bladder without complication. A pan cystoscopy was performed and the bladder appeared unremarkable. We then focused our attention on the Left ureteral orifice, which we cannulated with our glidewire, advanced up to renal pelvis. We then used a dual lumen catheter to place a second wire.   Once in good position, we advanced our rigid ureteroscope alongside the guidewire. We identified the mid ureteral calculus which was impacted in the ureter. We did use a stone basket to basket out any larger fragments. We withdrew the ureteroscope and visualized the entire ureter. No stone fragments were identified. No damage to the ureter was identified. The distal ureter was very narrow where the stone was previously impacted    At this time, over the remaining glidewire, we placed a 6 x 26 double j stent stent in the usual fashion, and we noted appropriate placement in the upper collecting system using fluoroscopy. There was a good curl noted in the bladder. We decided to leave a string at the end of the stent, which was attached with benzoin and steri-strips. The patient's bladder was drained and removed the scope and the procedure was subsequently terminated. I was present for all critical portions of the procedure.     Plan:  Discharge home in good condition  The patient can pull the stent in 7 days

## 2021-07-07 ENCOUNTER — TELEPHONE (OUTPATIENT)
Dept: UROLOGY | Age: 36
End: 2021-07-07

## 2021-07-07 ENCOUNTER — TELEPHONE (OUTPATIENT)
Dept: FAMILY MEDICINE CLINIC | Age: 36
End: 2021-07-07

## 2021-07-07 DIAGNOSIS — N20.0 KIDNEY STONE: Primary | ICD-10-CM

## 2021-07-07 NOTE — TELEPHONE ENCOUNTER
[facility] Initial Follow Up Call    Outreach made within 2 business days of discharge: Yes    Patient: Melany Montenegro Patient : 1985   MRN: [id number]  Reason for Admission: There are no discharge diagnoses documented for the most recent discharge. Discharge Date: 21       Spoke with: Alcira Rosas    Discharge department/facility: [facility]    TCM Interactive Patient Contact:  Was patient able to fill all prescriptions: Yes  Was patient instructed to bring all medications to the follow-up visit: Yes  Is patient taking all medications as directed in the discharge summary? Yes  Does patient understand their discharge instructions: Yes  Does patient have questions or concerns that need addressed prior to 7-14 day follow up office visit: no - scheduled an appointment 2021 pt verbalized understanding.      Scheduled appointment with PCP within 7-14 days    Follow Up  Future Appointments   Date Time Provider Eugene Stinson   2021  8:30 AM STR ULTRASOUND RM 2 STRZ US STR Radiolog   2021  2:45 PM Yung Salguero  [facility] NEVILLE Conroy

## 2021-07-08 LAB — URINE CULTURE, ROUTINE: NORMAL

## 2021-07-09 LAB — STONE ANALYSIS: NORMAL

## 2021-08-17 ENCOUNTER — HOSPITAL ENCOUNTER (OUTPATIENT)
Dept: ULTRASOUND IMAGING | Age: 36
Discharge: HOME | End: 2021-08-17
Payer: MEDICARE

## 2021-08-17 DIAGNOSIS — N20.0 KIDNEY STONE: ICD-10-CM

## 2021-08-17 PROCEDURE — 76770 US EXAM ABDO BACK WALL COMP: CPT

## 2021-09-30 ENCOUNTER — OFFICE VISIT (OUTPATIENT)
Dept: UROLOGY | Age: 36
End: 2021-09-30
Payer: MEDICARE

## 2021-09-30 VITALS — HEIGHT: 70 IN | WEIGHT: 212 LBS | BODY MASS INDEX: 30.35 KG/M2 | RESPIRATION RATE: 16 BRPM

## 2021-09-30 DIAGNOSIS — N20.0 KIDNEY STONE: Primary | ICD-10-CM

## 2021-09-30 PROCEDURE — 4004F PT TOBACCO SCREEN RCVD TLK: CPT | Performed by: UROLOGY

## 2021-09-30 PROCEDURE — G8417 CALC BMI ABV UP PARAM F/U: HCPCS | Performed by: UROLOGY

## 2021-09-30 PROCEDURE — 99213 OFFICE O/P EST LOW 20 MIN: CPT | Performed by: UROLOGY

## 2021-09-30 PROCEDURE — G8427 DOCREV CUR MEDS BY ELIG CLIN: HCPCS | Performed by: UROLOGY

## 2021-09-30 NOTE — PROGRESS NOTES
BETTY Aponte MD        [facility] [address] [facility] [address]  Dept: [phone]  Dept Fax: [phone]: [address] [address] Urology Office Note      Patient:  Johnny Chavez  YOB: 1985    The patient is a 37 y.o. male who presents today for evaluation of the following problems:   Chief Complaint   Patient presents with    Follow-up     CYSTO, LEFT URETEROSCOPY, BASKET RETRIEVAL OF STONE, STENT PLACEMENT-renal US prior         HISTORY OF PRESENT ILLNESS:     Left kidney stones  Had recent surgery  pt pulled stent without issues  Renal u/s today is normal        Requested/reviewed records from [facility] [employer], DO office and/or outside physician/EMR    (Patient's old records have been requested, reviewed and pertinent findings summarized in today's note.)    Procedures Today: N/A    Last several PSA's:  No results found for: PSA    Last total testosterone:  No results found for: TESTOSTERONE    Urinalysis today:  No results found for this visit on 09/30/21. Last BUN and creatinine:  Lab Results   Component Value Date    BUN 10 07/06/2021     Lab Results   Component Value Date    CREATININE 0.8 07/06/2021       Imaging Reviewed during this Office Visit:   Dylan Santana MD independently reviewed the images and verified the radiology reports from:    US RENAL COMPLETE    Result Date: 8/17/2021  BILATERAL RENAL ULTRASOUND: CLINICAL INFORMATION: History of a kidney stone. Lithotripsy one month ago. COMPARISON: No comparison available. TECHNIQUE: Multiple sonographic images of both kidneys were obtained. Images of the urinary bladder were also obtained.  FINDINGS: RIGHT KIDNEY - 11.5 x 5.2 x 5.4 cm Resistive Index - 0.65 Cortical Thickness - 1.2 cm LEFT KIDNEY - 12.6 x 6.1 x 7.1 cm Resistive Index - 0.51 Cortical Thickness - 1.5 cm URINARY BLADDER Pre-Void - 40.5 mL Post-Void - patient declined to void  KIDNEYS:  Both kidneys are normal in size and contour. The resistive indices are normal.  MASS/CYST: No solid or cystic lesion of either kidney is seen. HYDRONEPHROSIS:  There is no hydronephrosis. CALCULI:  No calculi are seen. BLADDER:  The urinary bladder is unremarkable. .     Normal renal ultrasound **This report has been created using voice recognition software. It may contain minor errors which are inherent in voice recognition technology. ** Final report electronically signed by Dr. Vickie Montenegro on 8/17/2021 2:44 PM      PAST MEDICAL, FAMILY AND SOCIAL HISTORY:  Past Medical History:   Diagnosis Date    Staph infection 2014     Past Surgical History:   Procedure Laterality Date    APPENDECTOMY      DENTAL SURGERY      URETER SURGERY Left 7/6/2021    CYSTO, LEFT URETEROSCOPY, BASKET RETRIEVAL OF STONES, STENT PLACEMENT performed by Charlette Stone MD at [facility]       Family History   Problem Relation Age of Onset    High Blood Pressure Father     Asthma Father      Outpatient Medications Marked as Taking for the 9/30/21 encounter (Office Visit) with Charlette Stone MD   Medication Sig Dispense Refill    GABAPENTIN PO Take by mouth 3 times daily      UNKNOWN TO PATIENT Anti inflammatory-Daypro         Patient has no known allergies.   Social History     Tobacco Use   Smoking Status Current Every Day Smoker    Packs/day: 0.50    Years: 13.00    Pack years: 6.50    Types: Cigarettes    Start date: 1/10/2007   Smokeless Tobacco Former User    Types: Chew   Tobacco Comment    always trying to quit      (If patient a smoker, smoking cessation counseling offered)   Social History     Substance and Sexual Activity   Alcohol Use No       REVIEW OF SYSTEMS:  Constitutional: negative  Eyes: negative  Respiratory: negative  Cardiovascular: negative  Gastrointestinal: negative  Genitourinary: see HPI  Musculoskeletal: negative  Skin: negative   Neurological: negative  Hematological/Lymphatic: negative  Psychological: negative      Physical Exam:    This a 37 y.o. male  Vitals:    09/30/21 1314   Resp: 16     Body mass index is 30.42 kg/m². Constitutional: Patient in no acute distress;         Assessment and Plan        1. Kidney stone               Plan:      First time stone former  Stone treated. Doing well. Stent out. Reviewed u/s  Will get KUB in one year  If negative after 2-3 years, PRN            Prescriptions Ordered:  No orders of the defined types were placed in this encounter. Orders Placed:  No orders of the defined types were placed in this encounter.            Gopi Castillo MD

## 2021-11-03 ENCOUNTER — HOSPITAL ENCOUNTER (OUTPATIENT)
Dept: PHYSICAL THERAPY | Age: 36
Setting detail: THERAPIES SERIES
Discharge: HOME | End: 2021-11-03
Payer: COMMERCIAL

## 2021-11-03 PROCEDURE — 97750 PHYSICAL PERFORMANCE TEST: CPT

## 2021-11-03 NOTE — DISCHARGE SUMMARY
[address] [address] [address]  [place]  [phone]    Name: Mundo Carbajal   : 32-  SSN:   Physician: Dr. Laith Hunter  Diagnosis: M51.24    Client in clinic for completion of functional capacity evaluation. Please refer to evaluation in chart for details. Client is discharged at this time. Thank you for allowing me to assist in the care of this patient.      Melissa Vega, PT #[id number] 11/3/2021 2:24 PM

## 2021-11-03 NOTE — PROGRESS NOTES
[facility] [address]  [place]  [phone]    Name: Kane Villegas   : 58-  SSN:   Physician: Dr. Porfirio Posada  Diagnosis: M51.24    Client in clinic for completion of functional capacity evaluation. Please refer to evaluation in chart for details. Thank you for allowing me to assist in the care of this patient.      Akhil Steen, PT [id number] 11/3/2021 2:22 PM   Time in 1230  Time out 1400

## 2022-01-14 ENCOUNTER — HOSPITAL ENCOUNTER (EMERGENCY)
Age: 37
Discharge: HOME | End: 2022-01-14
Payer: MEDICARE

## 2022-01-14 VITALS
HEART RATE: 78 BPM | TEMPERATURE: 98.5 F | DIASTOLIC BLOOD PRESSURE: 85 MMHG | RESPIRATION RATE: 16 BRPM | OXYGEN SATURATION: 98 % | SYSTOLIC BLOOD PRESSURE: 143 MMHG

## 2022-01-14 DIAGNOSIS — U07.1 COVID-19: Primary | ICD-10-CM

## 2022-01-14 LAB — SARS-COV-2, NAA: DETECTED

## 2022-01-14 PROCEDURE — 99213 OFFICE O/P EST LOW 20 MIN: CPT | Performed by: NURSE PRACTITIONER

## 2022-01-14 PROCEDURE — 87635 SARS-COV-2 COVID-19 AMP PRB: CPT

## 2022-01-14 PROCEDURE — 99213 OFFICE O/P EST LOW 20 MIN: CPT

## 2022-01-14 RX ORDER — PREGABALIN 25 MG/1
CAPSULE ORAL
COMMUNITY
Start: 2021-12-07

## 2022-01-14 ASSESSMENT — ENCOUNTER SYMPTOMS
CHEST TIGHTNESS: 0
VOMITING: 0
SORE THROAT: 0
SHORTNESS OF BREATH: 0
DIARRHEA: 0
RHINORRHEA: 0
COUGH: 0
NAUSEA: 0

## 2022-01-14 ASSESSMENT — PAIN SCALES - GENERAL: PAINLEVEL_OUTOF10: 9

## 2022-01-14 ASSESSMENT — PAIN DESCRIPTION - LOCATION: LOCATION: GENERALIZED;HEAD

## 2022-01-14 ASSESSMENT — PAIN DESCRIPTION - PAIN TYPE: TYPE: ACUTE PAIN

## 2022-01-14 ASSESSMENT — PAIN DESCRIPTION - DESCRIPTORS: DESCRIPTORS: ACHING

## 2022-01-14 NOTE — ED TRIAGE NOTES
Wilbert Griffin arrives to room with complaint of fever chillls headache body aches symptoms started 1 days ago.

## 2022-01-14 NOTE — Clinical Note
Stacey Montemayor was seen and treated in our emergency department on 1/14/2022. He may return to work on 01/16/2022. May be off work 2 days, CDC recommendation of isolation for 5 days then can return to work if symptoms are improving or resolved with a mask. If you have any questions or concerns, please don't hesitate to call.       Marci Mayer, GAVI - CNP

## 2022-01-14 NOTE — ED PROVIDER NOTES
[facility]  Urgent Care Encounter       CHIEF COMPLAINT       Chief Complaint   Patient presents with    Headache    Generalized Body Aches    Chills       Nurses Notes reviewed and I agree except as noted in the HPI. HISTORY OF PRESENT ILLNESS   Marcus Pedraza is a 31 y.o. male who presents to the [facility] urgent care for evaluation of headache. He reports that his symptoms started yesterday. He does report that he had a close exposure [facility] as his father has Caterina. He does report that he lives with his father. He reports his symptoms as headache, generalized body aches, chills, and fever. He denies loss of taste or smell. He denies nausea, vomiting, diarrhea. The history is provided by the patient. No  was used. REVIEW OF SYSTEMS     Review of Systems   Constitutional: Positive for chills and fever. Negative for activity change, appetite change and fatigue. HENT: Negative for ear discharge, ear pain, rhinorrhea and sore throat. Respiratory: Negative for cough, chest tightness and shortness of breath. Cardiovascular: Negative for chest pain. Gastrointestinal: Negative for diarrhea, nausea and vomiting. Genitourinary: Negative for dysuria. Musculoskeletal: Positive for myalgias. Skin: Negative for rash. Allergic/Immunologic: Negative for environmental allergies and food allergies. Neurological: Positive for headaches. Negative for dizziness. PAST MEDICAL HISTORY         Diagnosis Date    Staph infection 2014       SURGICALHISTORY     Patient  has a past surgical history that includes Appendectomy; Dental surgery; [place] tooth extraction; and Ureter surgery (Left, 7/6/2021).     CURRENT MEDICATIONS       Discharge Medication List as of 1/14/2022  9:47 AM      CONTINUE these medications which have NOT CHANGED    Details   pregabalin (LYRICA) 25 MG capsule TAKE 1 CAPSULE BY MOUTH TWO TIMES A DAYHistorical Med ALLERGIES     Patient is has No Known Allergies. Patients   There is no immunization history on file for this patient. FAMILY HISTORY     Patient's family history includes Asthma in his father; High Blood Pressure in his father. SOCIAL HISTORY     Patient  reports that he has been smoking cigarettes. He started smoking about 15 years ago. He has a 6.50 pack-year smoking history. He has quit using smokeless tobacco.  His smokeless tobacco use included chew. He reports that he does not drink alcohol and does not use drugs. PHYSICAL EXAM     ED TRIAGE VITALS  BP: (!) 143/85, Temp: 98.5 °F (36.9 °C), Pulse: 78, Resp: 16, SpO2: 98 %,Estimated body mass index is 30.42 kg/m² as calculated from the following:    Height as of 9/30/21: 5' 10\" (1.778 m). Weight as of 9/30/21: 212 lb (96.2 kg). ,No LMP for male patient. Physical Exam  Vitals and nursing note reviewed. Constitutional:       General: He is not in acute distress. Appearance: Normal appearance. He is not ill-appearing, toxic-appearing or diaphoretic. HENT:      Head: Normocephalic. Right Ear: Ear canal and external ear normal.      Left Ear: Ear canal and external ear normal.      Nose: Nose normal. No congestion or rhinorrhea. Mouth/Throat:      Mouth: Mucous membranes are moist.      Pharynx: Oropharynx is clear. No oropharyngeal exudate or posterior oropharyngeal erythema. Cardiovascular:      Rate and Rhythm: Normal rate. Pulses: Normal pulses. Pulmonary:      Effort: Pulmonary effort is normal. No respiratory distress. Breath sounds: No stridor. No wheezing or rhonchi. Abdominal:      General: Abdomen is flat. Bowel sounds are normal.      Palpations: Abdomen is soft. Musculoskeletal:         General: No swelling or tenderness. Normal range of motion. Cervical back: Normal range of motion. Neurological:      General: No focal deficit present.       Mental Status: He is alert and oriented to person, place, and time. Psychiatric:         Mood and Affect: Mood normal.         Behavior: Behavior normal.         DIAGNOSTIC RESULTS     Labs:  Results for orders placed or performed during the hospital encounter of 01/14/22   COVID-19, Rapid   Result Value Ref Range    SARS-CoV-2, SURAJ DETECTED (AA) NOT DETECTED       IMAGING:    No orders to display         EKG: None      URGENT CARE COURSE:     Vitals:    01/14/22 0919   BP: (!) 143/85   Pulse: 78   Resp: 16   Temp: 98.5 °F (36.9 °C)   TempSrc: Temporal   SpO2: 98%       Medications - No data to display         PROCEDURES:  None    FINAL IMPRESSION      1. COVID-19          DISPOSITION/ PLAN     Patient seen and evaluated for the above symptoms. A rapid Covid swab was obtained and positive. He is instructed to isolate for 5 days from symptom onset. He is instructed to return to work on day 6 if symptoms are manageable and he is afebrile. He is instructed to wear a face covering for the next 5 days. He is provided a work note for 2 days with the current CDC recommendations for isolation. He is instructed to use over-the-counter Tylenol and Motrin for pain or fever. He is instructed to use zinc, vitamin C, vitamin D3, and Pepcid. He is instructed to use Mucinex D, Flonase, and Zyrtec for symptom relief. He is instructed to follow-up with his PCP in 3 to 5 days with new or worsening symptoms. He is agreeable with the above plan and denies questions or concerns at this time.         PATIENT REFERRED TO:  Karlos Giraldo [address] [address]      DISCHARGE MEDICATIONS:  Discharge Medication List as of 1/14/2022  9:47 AM          Discharge Medication List as of 1/14/2022  9:47 AM      STOP taking these medications       GABAPENTIN PO Comments:   Reason for Stopping:         UNKNOWN TO PATIENT Comments:   Reason for Stopping:               Discharge Medication List as of 1/14/2022  9:47 AM          GAVI August - CNP    (Please note that portions of this note were completed with a voice recognition program. Efforts were made to edit the dictations but occasionally words are mis-transcribed.)           Dewane Habermann, APRN - CNP  01/14/22 1003

## 2022-01-20 ENCOUNTER — APPOINTMENT (OUTPATIENT)
Dept: GENERAL RADIOLOGY | Age: 37
End: 2022-01-20
Payer: MEDICARE

## 2022-01-20 ENCOUNTER — HOSPITAL ENCOUNTER (EMERGENCY)
Age: 37
Discharge: HOME | End: 2022-01-20
Payer: MEDICARE

## 2022-01-20 VITALS
SYSTOLIC BLOOD PRESSURE: 146 MMHG | HEART RATE: 63 BPM | OXYGEN SATURATION: 97 % | DIASTOLIC BLOOD PRESSURE: 87 MMHG | RESPIRATION RATE: 16 BRPM | TEMPERATURE: 98.2 F

## 2022-01-20 DIAGNOSIS — J40 BRONCHITIS DUE TO COVID-19 VIRUS: Primary | ICD-10-CM

## 2022-01-20 DIAGNOSIS — U07.1 BRONCHITIS DUE TO COVID-19 VIRUS: Primary | ICD-10-CM

## 2022-01-20 PROCEDURE — 99213 OFFICE O/P EST LOW 20 MIN: CPT

## 2022-01-20 PROCEDURE — 99213 OFFICE O/P EST LOW 20 MIN: CPT | Performed by: NURSE PRACTITIONER

## 2022-01-20 PROCEDURE — 71046 X-RAY EXAM CHEST 2 VIEWS: CPT

## 2022-01-20 RX ORDER — PREDNISONE 20 MG/1
40 TABLET ORAL DAILY
Qty: 14 TABLET | Refills: 0 | Status: SHIPPED | OUTPATIENT
Start: 2022-01-20 | End: 2022-01-27

## 2022-01-20 RX ORDER — AZITHROMYCIN 250 MG/1
TABLET, FILM COATED ORAL
Qty: 1 PACKET | Refills: 0 | Status: SHIPPED | OUTPATIENT
Start: 2022-01-20

## 2022-01-20 RX ORDER — ALBUTEROL SULFATE 90 UG/1
2 AEROSOL, METERED RESPIRATORY (INHALATION) EVERY 4 HOURS PRN
Qty: 18 G | Refills: 0 | Status: SHIPPED | OUTPATIENT
Start: 2022-01-20

## 2022-01-20 RX ORDER — BENZONATATE 200 MG/1
200 CAPSULE ORAL 3 TIMES DAILY PRN
Qty: 21 CAPSULE | Refills: 0 | Status: SHIPPED | OUTPATIENT
Start: 2022-01-20 | End: 2022-01-27

## 2022-01-20 ASSESSMENT — ENCOUNTER SYMPTOMS
SORE THROAT: 0
NAUSEA: 0
CHEST TIGHTNESS: 1
COUGH: 1
SHORTNESS OF BREATH: 1
VOMITING: 0
DIARRHEA: 0

## 2022-01-20 ASSESSMENT — PAIN DESCRIPTION - PAIN TYPE: TYPE: ACUTE PAIN

## 2022-01-20 ASSESSMENT — PAIN SCALES - GENERAL: PAINLEVEL_OUTOF10: 7

## 2022-01-20 ASSESSMENT — PAIN DESCRIPTION - LOCATION: LOCATION: HEAD

## 2022-01-20 NOTE — ED PROVIDER NOTES
[facility]  Urgent Care Encounter       CHIEF COMPLAINT       Chief Complaint   Patient presents with    Positive For Covid-19    Headache    Cough    Shortness of Breath       Nurses Notes reviewed and I agree except as noted in the HPI. HISTORY OF PRESENT ILLNESS   Gilma Shukla is a 31 y.o. male who presents for evaluation of headache, cough, chest tightness and shortness of breath. Patient states that he tested positive for COVID 6 days ago with his symptoms beginning 7 days ago. He states that he has been taking over-the-counter vitamin supplements as well as Tylenol at home without much relief of symptoms. He denies any significant shortness of breath. The history is provided by the patient. REVIEW OF SYSTEMS     Review of Systems   Constitutional: Negative for chills and fever. HENT: Positive for congestion. Negative for sore throat. Respiratory: Positive for cough, chest tightness and shortness of breath. Cardiovascular: Negative for chest pain. Gastrointestinal: Negative for diarrhea, nausea and vomiting. Musculoskeletal: Negative for arthralgias and myalgias. Skin: Negative for rash. Allergic/Immunologic: Negative for environmental allergies. Neurological: Positive for headaches. PAST MEDICAL HISTORY         Diagnosis Date    Staph infection 2014       SURGICALHISTORY     Patient  has a past surgical history that includes Appendectomy; Dental surgery; [place] tooth extraction; and Ureter surgery (Left, 7/6/2021). CURRENT MEDICATIONS       Previous Medications    PREGABALIN (LYRICA) 25 MG CAPSULE    TAKE 1 CAPSULE BY MOUTH TWO TIMES A DAY       ALLERGIES     Patient is has No Known Allergies. Patients   There is no immunization history on file for this patient. FAMILY HISTORY     Patient's family history includes Asthma in his father; High Blood Pressure in his father.     SOCIAL HISTORY     Patient  reports that he has been smoking cigarettes. He started smoking about 15 years ago. He has a 6.50 pack-year smoking history. He has quit using smokeless tobacco.  His smokeless tobacco use included chew. He reports that he does not drink alcohol and does not use drugs. PHYSICAL EXAM     ED TRIAGE VITALS  BP: (!) 146/87, Temp: 98.2 °F (36.8 °C), Pulse: 63, Resp: 16, SpO2: 97 %,Estimated body mass index is 30.42 kg/m² as calculated from the following:    Height as of 9/30/21: 5' 10\" (1.778 m). Weight as of 9/30/21: 212 lb (96.2 kg). ,No LMP for male patient. Physical Exam  Vitals and nursing note reviewed. Constitutional:       General: He is not in acute distress. Appearance: He is well-developed. He is not diaphoretic. HENT:      Right Ear: Tympanic membrane and ear canal normal.      Left Ear: Tympanic membrane and ear canal normal.      Mouth/Throat:      Mouth: Mucous membranes are moist.      Pharynx: Oropharynx is clear. Eyes:      Conjunctiva/sclera:      Right eye: Right conjunctiva is not injected. Left eye: Left conjunctiva is not injected. Pupils: Pupils are equal.   Cardiovascular:      Rate and Rhythm: Normal rate and regular rhythm. Heart sounds: No murmur heard. Pulmonary:      Effort: Pulmonary effort is normal. No respiratory distress. Breath sounds: Normal breath sounds. Musculoskeletal:      Cervical back: Normal range of motion. Right knee: Normal range of motion. Left knee: Normal range of motion. Skin:     General: Skin is warm. Findings: No rash. Neurological:      Mental Status: He is alert and oriented to person, place, and time. Psychiatric:         Behavior: Behavior normal.         DIAGNOSTIC RESULTS     Labs:No results found for this visit on 01/20/22. IMAGING:    XR CHEST (2 VW)   Final Result   No acute intrathoracic process. Clear lungs. **This report has been created using voice recognition software.   It may contain minor errors which are inherent in voice recognition technology. **      Final report electronically signed by Dr Cici Robledo on 1/20/2022 10:12 AM            EKG:      URGENT CARE COURSE:     Vitals:    01/20/22 0913   BP: (!) 146/87   Pulse: 63   Resp: 16   Temp: 98.2 °F (36.8 °C)   TempSrc: Temporal   SpO2: 97%       Medications - No data to display         PROCEDURES:  None    FINAL IMPRESSION      1. Bronchitis due to COVID-19 virus          DISPOSITION/ PLAN     Chest x-ray is negative for any acute process per the read the radiologist.  I discussed with the patient the plan to treat with oral prednisone, azithromycin, Tessalon Perles and an albuterol inhaler for his symptoms. I discussed that these are likely prolonged symptoms of COVID. He is advised to present to the ER if symptoms worsen and he is agreeable to plan as discussed. PATIENT REFERRED TO:  Helder Ashraf, DO  [address] / [facility] [place] [address]      DISCHARGE MEDICATIONS:  New Prescriptions    ALBUTEROL SULFATE  (90 BASE) MCG/ACT INHALER    Inhale 2 puffs into the lungs every 4 hours as needed for Wheezing or Shortness of Breath    AZITHROMYCIN (ZITHROMAX Z-JOELLEN) 250 MG TABLET    Take 2 tablets (500 mg) on Day 1, and then take 1 tablet (250 mg) on days 2 through 5.     BENZONATATE (TESSALON) 200 MG CAPSULE    Take 1 capsule by mouth 3 times daily as needed for Cough    PREDNISONE (DELTASONE) 20 MG TABLET    Take 2 tablets by mouth daily for 7 days       Discontinued Medications    No medications on file       Current Discharge Medication List          GAVI Abarca CNP    (Please note that portions of this note were completed with a voice recognition program. Efforts were made to edit the dictations but occasionally words are mis-transcribed.)          GAVI Abarca CNP  01/20/22 0068

## 2022-01-21 ENCOUNTER — CARE COORDINATION (OUTPATIENT)
Dept: CARE COORDINATION | Age: 37
End: 2022-01-21

## 2022-01-21 NOTE — CARE COORDINATION
Ambulatory Care Coordination ED COVID Follow up Call    Challenges to be reviewed by the provider   Additional needs identified to be addressed with provider: No  none                 Encounter was not routed to provider for escalation. Method of communication with provider: phone    Discussed [phone] related testing which was: available at this time. Test results were: positive. Patient informed of results, if available? Yes. Current Symptoms: cough    Reviewed New or Changed Meds: yes, azithromycin, albuterol inhaler, tessalon and prednisone. Pt states he was able to  from the pharmacy and is taking new meds. Do you have what you need at home?  Durable Medical Equipment ordered at discharge: None   Home Health/Outpatient orders at discharge: none   Was patient discharged with a pulse oximeter? No Discussed and confirmed pulse oximeter discharge instructions and when to notify provider or seek emergency care. Patient education provided: Reviewed appropriate site of care based on symptoms and resources available to patient including: PCP and Urgent care clinics. Follow up appointment recommended: yes. If no appointment scheduled, scheduling offered: n/a  Future Appointments   Date Time Provider Eugene Stinson   9/27/2022 12:45 PM [address]       Interventions: Obtained and reviewed discharge summary and/or continuity of care documents  Reviewed discharge instructions, medical action plan and red flags with family who verbalized understanding. Plan for follow-up call in 3-5 days based on severity of symptoms and risk factors. Plan for next call: 01/26/22  Provided contact information for future needs.     Becca Ramírez

## 2022-01-26 ENCOUNTER — CARE COORDINATION (OUTPATIENT)
Dept: CARE COORDINATION | Age: 37
End: 2022-01-26

## 2022-01-26 NOTE — CARE COORDINATION
Follow Up Call    Challenges to be reviewed by the provider   Additional needs identified to be addressed with provider: No  none                 Encounter was not routed to provider for escalation. Method of communication with provider:  phone. Contacted the patient by telephone to follow up after ED. Status: improved  Interventions to address identified needs: n/a    [facility] follow up appointment(s):   Future Appointments   Date Time Provider Eugene Stinson   9/27/2022 12:45 PM Callum Sterling, [address] [facility]     [address] [address] follow up appointment(s): pt to call and set up follow up appt for acid reflux   Follow up appointment completed? N/a    Provided contact information for future needs. No further follow-up call indicated based on severity of symptoms and risk factors.   Plan for next call: Lilia Dodd

## 2022-03-30 NOTE — ED NOTES
Clyde Martinez at bedside to assess pt.      Hi Lyeva, RN  10/16/17 1502 Verbal - The patient responds to verbal stimuli by opening their eyes when someone speaks to them. The patient is not fully oriented to time, place, or person.

## 2023-06-06 ENCOUNTER — HOSPITAL ENCOUNTER (EMERGENCY)
Age: 38
Discharge: HOME | End: 2023-06-06

## 2023-06-06 VITALS
DIASTOLIC BLOOD PRESSURE: 90 MMHG | OXYGEN SATURATION: 100 % | RESPIRATION RATE: 20 BRPM | HEART RATE: 89 BPM | TEMPERATURE: 98.6 F | SYSTOLIC BLOOD PRESSURE: 143 MMHG

## 2023-06-06 DIAGNOSIS — L02.91 ABSCESS: Primary | ICD-10-CM

## 2023-06-06 PROCEDURE — 99213 OFFICE O/P EST LOW 20 MIN: CPT | Performed by: NURSE PRACTITIONER

## 2023-06-06 RX ORDER — SULFAMETHOXAZOLE AND TRIMETHOPRIM 800; 160 MG/1; MG/1
1 TABLET ORAL 2 TIMES DAILY
Qty: 20 TABLET | Refills: 0 | Status: SHIPPED | OUTPATIENT
Start: 2023-06-06 | End: 2023-06-16

## 2023-06-06 RX ORDER — TIZANIDINE 4 MG/1
4 TABLET ORAL 2 TIMES DAILY
COMMUNITY
Start: 2023-05-15

## 2023-06-06 ASSESSMENT — ENCOUNTER SYMPTOMS
VOMITING: 0
SHORTNESS OF BREATH: 0
TROUBLE SWALLOWING: 0
COUGH: 0
SORE THROAT: 0
DIARRHEA: 0
EYE DISCHARGE: 0
NAUSEA: 1
RHINORRHEA: 0
EYE REDNESS: 0

## 2023-06-06 NOTE — ED PROVIDER NOTES
Differential diagnosis includes a herpetic charlotte. Medication as prescribed. Over-the-counter treatment as needed. Discussed warm soaks and Epsom salts. If symptoms worsen go to ER. PATIENT REFERRED TO:  [address] [address],[address] [address] [facility] [address]    Establish care as needed. Warm soaks and Epsom salt 2-3 times daily. Medication as prescribed. If symptoms worsen go to ER.     DISCHARGE MEDICATIONS:  Discharge Medication List as of 6/6/2023  5:05 PM        START taking these medications    Details   sulfamethoxazole-trimethoprim (BACTRIM DS) 800-160 MG per tablet Take 1 tablet by mouth 2 times daily for 10 days, Disp-20 tablet, R-0Normal           Discharge Medication List as of 6/6/2023  5:05 PM          Luz Gaviria, [address], GAVI - CNP  06/06/23 8724

## 2024-02-29 ENCOUNTER — HOSPITAL ENCOUNTER (EMERGENCY)
Age: 39
Discharge: HOME | End: 2024-02-29

## 2024-02-29 VITALS
WEIGHT: 200 LBS | OXYGEN SATURATION: 98 % | BODY MASS INDEX: 28 KG/M2 | HEART RATE: 79 BPM | HEIGHT: 71 IN | SYSTOLIC BLOOD PRESSURE: 142 MMHG | RESPIRATION RATE: 18 BRPM | DIASTOLIC BLOOD PRESSURE: 83 MMHG | TEMPERATURE: 97.5 F

## 2024-02-29 DIAGNOSIS — J02.0 ACUTE STREPTOCOCCAL PHARYNGITIS: Primary | ICD-10-CM

## 2024-02-29 LAB
FLUAV AG SPEC QL: NEGATIVE
FLUBV AG SPEC QL: NEGATIVE
S PYO AG THROAT QL: POSITIVE

## 2024-02-29 PROCEDURE — 99214 OFFICE O/P EST MOD 30 MIN: CPT | Performed by: NURSE PRACTITIONER

## 2024-02-29 PROCEDURE — 87651 STREP A DNA AMP PROBE: CPT

## 2024-02-29 PROCEDURE — 87804 INFLUENZA ASSAY W/OPTIC: CPT

## 2024-02-29 PROCEDURE — 99213 OFFICE O/P EST LOW 20 MIN: CPT

## 2024-02-29 RX ORDER — CETIRIZINE HYDROCHLORIDE, PSEUDOEPHEDRINE HYDROCHLORIDE 5; 120 MG/1; MG/1
1 TABLET, FILM COATED, EXTENDED RELEASE ORAL 2 TIMES DAILY
Qty: 14 TABLET | Refills: 0 | COMMUNITY
Start: 2024-02-29 | End: 2024-03-07

## 2024-02-29 RX ORDER — AZITHROMYCIN 250 MG/1
TABLET, FILM COATED ORAL
Qty: 1 PACKET | Refills: 0 | Status: SHIPPED | OUTPATIENT
Start: 2024-02-29 | End: 2024-03-04

## 2024-02-29 ASSESSMENT — ENCOUNTER SYMPTOMS
SWOLLEN GLANDS: 0
SHORTNESS OF BREATH: 0
WHEEZING: 0
CHOKING: 0
APNEA: 0
CHEST TIGHTNESS: 0
STRIDOR: 0
COUGH: 1
SINUS PAIN: 1
SORE THROAT: 1
RHINORRHEA: 1

## 2024-02-29 ASSESSMENT — PAIN DESCRIPTION - LOCATION
LOCATION_2: EAR
LOCATION: THROAT
LOCATION_3: HEAD

## 2024-02-29 ASSESSMENT — PAIN DESCRIPTION - INTENSITY
RATING_3: 4
RATING_2: 7

## 2024-02-29 ASSESSMENT — PAIN DESCRIPTION - ORIENTATION: ORIENTATION_2: LEFT;RIGHT

## 2024-02-29 ASSESSMENT — PAIN DESCRIPTION - DESCRIPTORS
DESCRIPTORS_3: ACHING
DESCRIPTORS_2: SHARP
DESCRIPTORS: SHARP

## 2024-02-29 ASSESSMENT — PAIN SCALES - GENERAL: PAINLEVEL_OUTOF10: 8

## 2024-02-29 ASSESSMENT — PAIN - FUNCTIONAL ASSESSMENT: PAIN_FUNCTIONAL_ASSESSMENT: 0-10

## 2024-02-29 NOTE — ED PROVIDER NOTES
[facility] URGENT CARE  Urgent Care Encounter      CHIEF COMPLAINT       Chief Complaint   Patient presents with    Otalgia     lela    Pharyngitis    Cough    Nasal Congestion    Headache       Nurses Notes reviewed and I agree except as noted in the HPI.  HISTORY OFPRESENT ILLNESS   Johann Garcia is a 38 y.o.  The history is provided by the patient. No  was used.   URI  Presenting symptoms: congestion, cough, fatigue, rhinorrhea and sore throat    Presenting symptoms: no ear pain, no facial pain and no fever    Severity:  Severe  Onset quality:  Sudden  Duration:  1 day  Timing:  Constant  Progression:  Unchanged  Chronicity:  New  Relieved by:  Nothing  Worsened by:  Certain positions  Ineffective treatments:  OTC medications  Associated symptoms: headaches, myalgias and sinus pain    Associated symptoms: no arthralgias, no neck pain, no sneezing, no swollen glands and no wheezing    Risk factors: sick contacts    Risk factors: not elderly, no chronic cardiac disease, no chronic kidney disease, no chronic respiratory disease, no diabetes mellitus, no immunosuppression, no recent illness and no recent travel        REVIEW OF SYSTEMS     Review of Systems   Constitutional:  Positive for activity change, appetite change and fatigue. Negative for chills, diaphoresis and fever.   HENT:  Positive for congestion, postnasal drip, rhinorrhea, sinus pain and sore throat. Negative for ear pain and sneezing.    Respiratory:  Positive for cough. Negative for apnea, choking, chest tightness, shortness of breath, wheezing and stridor.    Cardiovascular:  Negative for chest pain, palpitations and leg swelling.   Musculoskeletal:  Positive for myalgias. Negative for arthralgias and neck pain.   Neurological:  Positive for headaches.       PAST MEDICAL HISTORY         Diagnosis Date    Staph infection 2014       SURGICAL HISTORY     Patient  has a past surgical history that includes Appendectomy;

## 2024-02-29 NOTE — ED NOTES
Pt with complaints of bilateral ear pain, sore throat, cough, nasal congestion and headache that started 4 days ago. Denies trying any medications. Denies being around anyone ill.     Jenaro Osborn LPN  02/29/24 0957

## 2025-05-01 ENCOUNTER — HOSPITAL ENCOUNTER (EMERGENCY)
Age: 40
Discharge: HOME | End: 2025-05-01

## 2025-05-01 VITALS
SYSTOLIC BLOOD PRESSURE: 120 MMHG | BODY MASS INDEX: 32.64 KG/M2 | TEMPERATURE: 98.7 F | WEIGHT: 234 LBS | OXYGEN SATURATION: 97 % | DIASTOLIC BLOOD PRESSURE: 78 MMHG | RESPIRATION RATE: 16 BRPM | HEART RATE: 75 BPM

## 2025-05-01 DIAGNOSIS — J06.9 VIRAL URI: Primary | ICD-10-CM

## 2025-05-01 LAB — S PYO AG THROAT QL: NEGATIVE

## 2025-05-01 PROCEDURE — 99213 OFFICE O/P EST LOW 20 MIN: CPT

## 2025-05-01 PROCEDURE — 87651 STREP A DNA AMP PROBE: CPT

## 2025-05-01 RX ORDER — PSEUDOEPHEDRINE HCL 30 MG/1
30 TABLET, FILM COATED ORAL EVERY 4 HOURS PRN
Qty: 42 TABLET | Refills: 0 | Status: SHIPPED | OUTPATIENT
Start: 2025-05-01 | End: 2025-05-08

## 2025-05-01 RX ORDER — PREDNISONE 20 MG/1
20 TABLET ORAL 2 TIMES DAILY
Qty: 10 TABLET | Refills: 0 | Status: SHIPPED | OUTPATIENT
Start: 2025-05-01 | End: 2025-05-06

## 2025-05-01 ASSESSMENT — ENCOUNTER SYMPTOMS
COUGH: 0
SORE THROAT: 1
SHORTNESS OF BREATH: 0
SINUS PRESSURE: 1

## 2025-05-01 ASSESSMENT — PAIN DESCRIPTION - ORIENTATION: ORIENTATION: RIGHT;LEFT

## 2025-05-01 ASSESSMENT — PAIN DESCRIPTION - LOCATION: LOCATION: HEAD;EAR

## 2025-05-01 ASSESSMENT — PAIN - FUNCTIONAL ASSESSMENT
PAIN_FUNCTIONAL_ASSESSMENT: 0-10
PAIN_FUNCTIONAL_ASSESSMENT: ACTIVITIES ARE NOT PREVENTED

## 2025-05-01 ASSESSMENT — PAIN DESCRIPTION - FREQUENCY: FREQUENCY: CONTINUOUS

## 2025-05-01 ASSESSMENT — PAIN SCALES - GENERAL: PAINLEVEL_OUTOF10: 6

## 2025-05-01 NOTE — ED TRIAGE NOTES
Johann arrives to room with complaint of  right sided facial pain, headache, sore throat, bilateral ear pain.   symptoms started yesterday      Has not taken any meds for symptoms

## 2025-05-01 NOTE — ED PROVIDER NOTES
[facility] URGENT CARE  Urgent Care Encounter      CHIEF COMPLAINT       Chief Complaint   Patient presents with    Facial Pain    Headache       Nurses Notes reviewed and I agree except as noted in the HPI.  HISTORY OF PRESENT ILLNESS   Johann Garcia is a 39 y.o. male who presents to urgent care with complaints of sinus pressure, headache, sore throat, ear pain.  Patient reports symptoms started yesterday.  Patient denies taking any over-the-counter medications for symptoms.  Denies shortness of breath, abdominal pain, diarrhea, fevers.  Patient denies being around anyone sick recently that he is aware of.    REVIEW OF SYSTEMS     Review of Systems   Constitutional:  Negative for fever.   HENT:  Positive for congestion, ear pain, sinus pressure and sore throat.    Respiratory:  Negative for cough and shortness of breath.    Neurological:  Negative for seizures.       PAST MEDICAL HISTORY         Diagnosis Date    Staph infection 2014       SURGICAL HISTORY     Patient  has a past surgical history that includes Appendectomy; Dental surgery; [place] tooth extraction; and Ureter surgery (Left, 7/6/2021).    CURRENT MEDICATIONS       Discharge Medication List as of 5/1/2025 11:28 AM          ALLERGIES     Patient is has no known allergies.    FAMILY HISTORY     Patient'sfamily history includes Asthma in his father; High Blood Pressure in his father.    SOCIAL HISTORY     Patient  reports that he has been smoking cigarettes. He started smoking about 18 years ago. He has a 9.2 pack-year smoking history. He has quit using smokeless tobacco.  His smokeless tobacco use included chew. He reports that he does not drink alcohol and does not use drugs.    PHYSICAL EXAM     ED TRIAGE VITALS  BP: 120/78, Temp: 98.7 °F (37.1 °C), Pulse: 75, Respirations: 16, SpO2: 97 %  Physical Exam  Vitals and nursing note reviewed.   Constitutional:       General: He is not in acute distress.     Appearance: Normal appearance. He is obese.  He is ill-appearing. He is not diaphoretic.   HENT:      Head: Normocephalic and atraumatic.      Right Ear: Tympanic membrane, ear canal and external ear normal.      Left Ear: Tympanic membrane, ear canal and external ear normal.      Nose: Congestion present.      Mouth/Throat:      Pharynx: Posterior oropharyngeal erythema present.   Cardiovascular:      Rate and Rhythm: Normal rate and regular rhythm.   Pulmonary:      Effort: Pulmonary effort is normal. No respiratory distress.      Breath sounds: Normal breath sounds. No stridor. No wheezing or rhonchi.   Skin:     General: Skin is warm and dry.      Capillary Refill: Capillary refill takes less than 2 seconds.   Neurological:      General: No focal deficit present.      Mental Status: He is alert and oriented to person, place, and time. Mental status is at baseline.   Psychiatric:         Mood and Affect: Mood normal.         DIAGNOSTIC RESULTS   Labs:  Results for orders placed or performed during the hospital encounter of 05/01/25   Strep Screen Group A Throat   Result Value Ref Range    Rapid Strep A Screen NEGATIVE        IMAGING:  No orders to display      URGENT CARE COURSE:     Vitals:    05/01/25 1103   BP: 120/78   Pulse: 75   Resp: 16   Temp: 98.7 °F (37.1 °C)   TempSrc: Oral   SpO2: 97%   Weight: 106.1 kg (234 lb)       Medications - No data to display  PROCEDURES:  None  FINAL IMPRESSION      1. Viral URI        DISPOSITION/PLAN   DISPOSITION Decision To Discharge 05/01/2025 11:27:29 AM   DISPOSITION CONDITION Stable         Rapid strep is negative today.  Physical exam reveals viral URI.  Discussed with patient plan to treat with oral decongestants and oral steroids.  Discussed with patient he may continue Tylenol and ibuprofen as needed.  If symptoms persist and do not improve over the next 7 to 10 days, follow-up with the primary care provider.  Increase water intake.  Work note provided.    PATIENT REFERRED TO:  [facility]

## (undated) DEVICE — SINGLE ACTION PUMPING SYSTEM

## (undated) DEVICE — SYSTEM PMP VAC SYR 10CC CONT FLO SGL ACT 1 W VLV SAPS

## (undated) DEVICE — CYSTO PACK: Brand: MEDLINE INDUSTRIES, INC.

## (undated) DEVICE — ADAPTER URO SCP UROLOK LL

## (undated) DEVICE — Device

## (undated) DEVICE — SINGLE-USE DIGITAL FLEXIBLE URETEROSCOPE: Brand: LITHOVUE

## (undated) DEVICE — DUAL LUMEN URETERAL CATHETER

## (undated) DEVICE — NITINOL STONE RETRIEVAL BASKET: Brand: ZERO TIP

## (undated) DEVICE — GUIDEWIRE URO L150CM DIA0.035IN STIFF NIT HYDRPHLC STR TIP

## (undated) DEVICE — 3M™ STERI-STRIP™ COMPOUND BENZOIN TINCTURE 40 BAGS/CARTON 4 CARTONS/CASE C1544: Brand: 3M™ STERI-STRIP™